# Patient Record
Sex: FEMALE | Race: WHITE | Employment: OTHER | URBAN - METROPOLITAN AREA
[De-identification: names, ages, dates, MRNs, and addresses within clinical notes are randomized per-mention and may not be internally consistent; named-entity substitution may affect disease eponyms.]

---

## 2022-01-26 ENCOUNTER — APPOINTMENT (OUTPATIENT)
Dept: CT IMAGING | Age: 87
DRG: 871 | End: 2022-01-26
Attending: EMERGENCY MEDICINE
Payer: MEDICARE

## 2022-01-26 ENCOUNTER — HOSPITAL ENCOUNTER (INPATIENT)
Age: 87
LOS: 7 days | Discharge: NURSING, REHAB OR LONG-TERM CARE | DRG: 871 | End: 2022-02-02
Attending: EMERGENCY MEDICINE | Admitting: FAMILY MEDICINE
Payer: MEDICARE

## 2022-01-26 ENCOUNTER — APPOINTMENT (OUTPATIENT)
Dept: GENERAL RADIOLOGY | Age: 87
DRG: 871 | End: 2022-01-26
Attending: EMERGENCY MEDICINE
Payer: MEDICARE

## 2022-01-26 ENCOUNTER — HOSPITAL ENCOUNTER (EMERGENCY)
Dept: CT IMAGING | Age: 87
Discharge: HOME | DRG: 871 | End: 2022-01-26
Attending: EMERGENCY MEDICINE
Payer: MEDICARE

## 2022-01-26 DIAGNOSIS — J18.9 PNEUMONIA OF BOTH LOWER LOBES DUE TO INFECTIOUS ORGANISM: ICD-10-CM

## 2022-01-26 DIAGNOSIS — N30.00 ACUTE CYSTITIS WITHOUT HEMATURIA: ICD-10-CM

## 2022-01-26 DIAGNOSIS — R65.21 SEPTIC SHOCK (HCC): Primary | ICD-10-CM

## 2022-01-26 DIAGNOSIS — I95.9 HYPOTENSION, UNSPECIFIED HYPOTENSION TYPE: ICD-10-CM

## 2022-01-26 DIAGNOSIS — A41.9 SEPTIC SHOCK (HCC): Primary | ICD-10-CM

## 2022-01-26 PROBLEM — J44.9 COPD (CHRONIC OBSTRUCTIVE PULMONARY DISEASE) (HCC): Status: ACTIVE | Noted: 2022-01-26

## 2022-01-26 PROBLEM — K80.50 CHOLEDOCHOLITHIASIS: Status: ACTIVE | Noted: 2022-01-26

## 2022-01-26 PROBLEM — J96.01 ACUTE HYPOXEMIC RESPIRATORY FAILURE (HCC): Status: ACTIVE | Noted: 2022-01-26

## 2022-01-26 PROBLEM — R13.10 DIFFICULTY SWALLOWING: Status: ACTIVE | Noted: 2022-01-26

## 2022-01-26 PROBLEM — K82.3: Status: ACTIVE | Noted: 2022-01-26

## 2022-01-26 PROBLEM — K59.00 CONSTIPATION: Status: ACTIVE | Noted: 2022-01-26

## 2022-01-26 PROBLEM — N39.0 UTI (URINARY TRACT INFECTION): Status: ACTIVE | Noted: 2022-01-26

## 2022-01-26 PROBLEM — I10 HTN (HYPERTENSION): Status: ACTIVE | Noted: 2022-01-26

## 2022-01-26 LAB
ALBUMIN SERPL-MCNC: 2.2 G/DL (ref 3.4–5)
ALBUMIN/GLOB SERPL: 0.5 {RATIO} (ref 0.8–1.7)
ALP SERPL-CCNC: 121 U/L (ref 45–117)
ALT SERPL-CCNC: 87 U/L (ref 13–56)
ANION GAP SERPL CALC-SCNC: 12 MMOL/L (ref 3–18)
APPEARANCE UR: CLEAR
AST SERPL-CCNC: 89 U/L (ref 10–38)
BACTERIA URNS QL MICRO: ABNORMAL /HPF
BASOPHILS # BLD: 0 K/UL (ref 0–0.1)
BASOPHILS NFR BLD: 0 % (ref 0–2)
BILIRUB SERPL-MCNC: 0.8 MG/DL (ref 0.2–1)
BILIRUB UR QL: NEGATIVE
BUN SERPL-MCNC: 13 MG/DL (ref 7–18)
BUN/CREAT SERPL: 13 (ref 12–20)
CALCIUM SERPL-MCNC: 9.5 MG/DL (ref 8.5–10.1)
CHLORIDE SERPL-SCNC: 106 MMOL/L (ref 100–111)
CO2 SERPL-SCNC: 24 MMOL/L (ref 21–32)
COLOR UR: YELLOW
COVID-19 RAPID TEST, COVR: NOT DETECTED
CREAT SERPL-MCNC: 1.04 MG/DL (ref 0.6–1.3)
DIFFERENTIAL METHOD BLD: ABNORMAL
EOSINOPHIL # BLD: 0 K/UL (ref 0–0.4)
EOSINOPHIL NFR BLD: 0 % (ref 0–5)
EPITH CASTS URNS QL MICRO: ABNORMAL /LPF (ref 0–5)
ERYTHROCYTE [DISTWIDTH] IN BLOOD BY AUTOMATED COUNT: 26 % (ref 11.6–14.5)
GLOBULIN SER CALC-MCNC: 4.4 G/DL (ref 2–4)
GLUCOSE SERPL-MCNC: 111 MG/DL (ref 74–99)
GLUCOSE UR STRIP.AUTO-MCNC: NEGATIVE MG/DL
HCT VFR BLD AUTO: 39.2 % (ref 35–45)
HGB BLD-MCNC: 12.1 G/DL (ref 12–16)
HGB UR QL STRIP: NEGATIVE
IMM GRANULOCYTES # BLD AUTO: 0.1 K/UL (ref 0–0.04)
IMM GRANULOCYTES NFR BLD AUTO: 1 % (ref 0–0.5)
INR PPP: 1.1 (ref 0.8–1.2)
KETONES UR QL STRIP.AUTO: NEGATIVE MG/DL
LACTATE BLD-SCNC: 4.61 MMOL/L (ref 0.4–2)
LACTATE BLD-SCNC: 5.08 MMOL/L (ref 0.4–2)
LEUKOCYTE ESTERASE UR QL STRIP.AUTO: ABNORMAL
LIPASE SERPL-CCNC: 69 U/L (ref 73–393)
LYMPHOCYTES # BLD: 0.7 K/UL (ref 0.9–3.6)
LYMPHOCYTES NFR BLD: 6 % (ref 21–52)
MAGNESIUM SERPL-MCNC: 2.2 MG/DL (ref 1.6–2.6)
MCH RBC QN AUTO: 25.4 PG (ref 24–34)
MCHC RBC AUTO-ENTMCNC: 30.9 G/DL (ref 31–37)
MCV RBC AUTO: 82.4 FL (ref 78–100)
MONOCYTES # BLD: 0.6 K/UL (ref 0.05–1.2)
MONOCYTES NFR BLD: 5 % (ref 3–10)
NEUTS SEG # BLD: 10.6 K/UL (ref 1.8–8)
NEUTS SEG NFR BLD: 88 % (ref 40–73)
NITRITE UR QL STRIP.AUTO: NEGATIVE
NRBC # BLD: 0 K/UL (ref 0–0.01)
NRBC BLD-RTO: 0 PER 100 WBC
PH UR STRIP: 7 [PH] (ref 5–8)
PLATELET # BLD AUTO: 500 K/UL (ref 135–420)
PLATELET COMMENTS,PCOM: ABNORMAL
PMV BLD AUTO: 9.6 FL (ref 9.2–11.8)
POTASSIUM SERPL-SCNC: 4.7 MMOL/L (ref 3.5–5.5)
PROT SERPL-MCNC: 6.6 G/DL (ref 6.4–8.2)
PROT UR STRIP-MCNC: NEGATIVE MG/DL
PROTHROMBIN TIME: 13.5 SEC (ref 11.5–15.2)
RBC # BLD AUTO: 4.76 M/UL (ref 4.2–5.3)
RBC #/AREA URNS HPF: ABNORMAL /HPF (ref 0–5)
RBC MORPH BLD: ABNORMAL
RBC MORPH BLD: ABNORMAL
SODIUM SERPL-SCNC: 142 MMOL/L (ref 136–145)
SOURCE, COVRS: NORMAL
SP GR UR REFRACTOMETRY: 1.01 (ref 1–1.03)
TROPONIN-HIGH SENSITIVITY: 29 NG/L (ref 0–54)
UROBILINOGEN UR QL STRIP.AUTO: 0.2 EU/DL (ref 0.2–1)
WBC # BLD AUTO: 12 K/UL (ref 4.6–13.2)
WBC URNS QL MICRO: ABNORMAL /HPF (ref 0–5)

## 2022-01-26 PROCEDURE — 87040 BLOOD CULTURE FOR BACTERIA: CPT

## 2022-01-26 PROCEDURE — 71045 X-RAY EXAM CHEST 1 VIEW: CPT

## 2022-01-26 PROCEDURE — 65660000000 HC RM CCU STEPDOWN

## 2022-01-26 PROCEDURE — 74011000636 HC RX REV CODE- 636: Performed by: STUDENT IN AN ORGANIZED HEALTH CARE EDUCATION/TRAINING PROGRAM

## 2022-01-26 PROCEDURE — 81001 URINALYSIS AUTO W/SCOPE: CPT

## 2022-01-26 PROCEDURE — 87635 SARS-COV-2 COVID-19 AMP PRB: CPT

## 2022-01-26 PROCEDURE — 83735 ASSAY OF MAGNESIUM: CPT

## 2022-01-26 PROCEDURE — 74011250636 HC RX REV CODE- 250/636: Performed by: EMERGENCY MEDICINE

## 2022-01-26 PROCEDURE — 86140 C-REACTIVE PROTEIN: CPT

## 2022-01-26 PROCEDURE — 96368 THER/DIAG CONCURRENT INF: CPT

## 2022-01-26 PROCEDURE — 83605 ASSAY OF LACTIC ACID: CPT

## 2022-01-26 PROCEDURE — 74011000258 HC RX REV CODE- 258: Performed by: EMERGENCY MEDICINE

## 2022-01-26 PROCEDURE — 96365 THER/PROPH/DIAG IV INF INIT: CPT

## 2022-01-26 PROCEDURE — 85610 PROTHROMBIN TIME: CPT

## 2022-01-26 PROCEDURE — 83690 ASSAY OF LIPASE: CPT

## 2022-01-26 PROCEDURE — 96367 TX/PROPH/DG ADDL SEQ IV INF: CPT

## 2022-01-26 PROCEDURE — 93005 ELECTROCARDIOGRAM TRACING: CPT

## 2022-01-26 PROCEDURE — 84484 ASSAY OF TROPONIN QUANT: CPT

## 2022-01-26 PROCEDURE — 74011000250 HC RX REV CODE- 250: Performed by: EMERGENCY MEDICINE

## 2022-01-26 PROCEDURE — 71260 CT THORAX DX C+: CPT

## 2022-01-26 PROCEDURE — 99285 EMERGENCY DEPT VISIT HI MDM: CPT

## 2022-01-26 PROCEDURE — 80053 COMPREHEN METABOLIC PANEL: CPT

## 2022-01-26 PROCEDURE — 74011250636 HC RX REV CODE- 250/636: Performed by: STUDENT IN AN ORGANIZED HEALTH CARE EDUCATION/TRAINING PROGRAM

## 2022-01-26 PROCEDURE — 96366 THER/PROPH/DIAG IV INF ADDON: CPT

## 2022-01-26 PROCEDURE — 85025 COMPLETE CBC W/AUTO DIFF WBC: CPT

## 2022-01-26 RX ORDER — POLYETHYLENE GLYCOL 3350 17 G/17G
17 POWDER, FOR SOLUTION ORAL DAILY
COMMUNITY

## 2022-01-26 RX ORDER — SODIUM CHLORIDE 9 MG/ML
100 INJECTION, SOLUTION INTRAVENOUS ONCE
Status: COMPLETED | OUTPATIENT
Start: 2022-01-26 | End: 2022-01-27

## 2022-01-26 RX ORDER — LACTOSE-REDUCED FOOD 0.06G-1/ML
LIQUID (ML) ORAL
COMMUNITY

## 2022-01-26 RX ORDER — ENOXAPARIN SODIUM 100 MG/ML
30 INJECTION SUBCUTANEOUS DAILY
Status: DISCONTINUED | OUTPATIENT
Start: 2022-01-27 | End: 2022-01-27

## 2022-01-26 RX ORDER — ONDANSETRON 4 MG/1
4 TABLET, ORALLY DISINTEGRATING ORAL
Status: DISCONTINUED | OUTPATIENT
Start: 2022-01-26 | End: 2022-02-02 | Stop reason: HOSPADM

## 2022-01-26 RX ORDER — PANTOPRAZOLE SODIUM 20 MG/1
20 TABLET, DELAYED RELEASE ORAL DAILY
COMMUNITY

## 2022-01-26 RX ORDER — CLOPIDOGREL BISULFATE 75 MG/1
TABLET ORAL
COMMUNITY

## 2022-01-26 RX ORDER — NOREPINEPHRINE BITARTRATE/D5W 8 MG/250ML
.5-3 PLASTIC BAG, INJECTION (ML) INTRAVENOUS
Status: DISCONTINUED | OUTPATIENT
Start: 2022-01-26 | End: 2022-01-27

## 2022-01-26 RX ORDER — FACIAL-BODY WIPES
10 EACH TOPICAL DAILY
Status: DISCONTINUED | OUTPATIENT
Start: 2022-01-26 | End: 2022-01-27

## 2022-01-26 RX ORDER — ACETAMINOPHEN 325 MG/1
650 TABLET ORAL
Status: DISCONTINUED | OUTPATIENT
Start: 2022-01-26 | End: 2022-02-02 | Stop reason: HOSPADM

## 2022-01-26 RX ORDER — SODIUM CHLORIDE 0.9 % (FLUSH) 0.9 %
5-40 SYRINGE (ML) INJECTION EVERY 8 HOURS
Status: DISCONTINUED | OUTPATIENT
Start: 2022-01-26 | End: 2022-02-02 | Stop reason: HOSPADM

## 2022-01-26 RX ORDER — SODIUM CHLORIDE 0.9 % (FLUSH) 0.9 %
5-40 SYRINGE (ML) INJECTION AS NEEDED
Status: DISCONTINUED | OUTPATIENT
Start: 2022-01-26 | End: 2022-02-02 | Stop reason: HOSPADM

## 2022-01-26 RX ORDER — LANOLIN ALCOHOL/MO/W.PET/CERES
400 CREAM (GRAM) TOPICAL DAILY
COMMUNITY

## 2022-01-26 RX ORDER — LEVOFLOXACIN 5 MG/ML
750 INJECTION, SOLUTION INTRAVENOUS
Status: DISCONTINUED | OUTPATIENT
Start: 2022-01-28 | End: 2022-01-28

## 2022-01-26 RX ORDER — POLYETHYLENE GLYCOL 3350 17 G/17G
17 POWDER, FOR SOLUTION ORAL DAILY PRN
Status: DISCONTINUED | OUTPATIENT
Start: 2022-01-26 | End: 2022-02-02 | Stop reason: HOSPADM

## 2022-01-26 RX ORDER — LEVOFLOXACIN 5 MG/ML
750 INJECTION, SOLUTION INTRAVENOUS EVERY 24 HOURS
Status: DISCONTINUED | OUTPATIENT
Start: 2022-01-26 | End: 2022-01-26

## 2022-01-26 RX ORDER — SODIUM CHLORIDE 0.9 % (FLUSH) 0.9 %
5-10 SYRINGE (ML) INJECTION AS NEEDED
Status: DISCONTINUED | OUTPATIENT
Start: 2022-01-26 | End: 2022-02-02 | Stop reason: HOSPADM

## 2022-01-26 RX ORDER — BISMUTH SUBSALICYLATE 262 MG
1 TABLET,CHEWABLE ORAL DAILY
COMMUNITY

## 2022-01-26 RX ORDER — ACETAMINOPHEN 650 MG/1
650 SUPPOSITORY RECTAL
Status: DISCONTINUED | OUTPATIENT
Start: 2022-01-26 | End: 2022-02-02 | Stop reason: HOSPADM

## 2022-01-26 RX ORDER — POTASSIUM CHLORIDE 750 MG/1
10 CAPSULE, EXTENDED RELEASE ORAL 2 TIMES DAILY
COMMUNITY

## 2022-01-26 RX ORDER — ACETAMINOPHEN 325 MG/1
TABLET ORAL
COMMUNITY

## 2022-01-26 RX ORDER — ESCITALOPRAM OXALATE 10 MG/1
10 TABLET ORAL DAILY
COMMUNITY

## 2022-01-26 RX ORDER — ATORVASTATIN CALCIUM 40 MG/1
TABLET, FILM COATED ORAL DAILY
COMMUNITY

## 2022-01-26 RX ORDER — ONDANSETRON 2 MG/ML
4 INJECTION INTRAMUSCULAR; INTRAVENOUS
Status: DISCONTINUED | OUTPATIENT
Start: 2022-01-26 | End: 2022-02-02 | Stop reason: HOSPADM

## 2022-01-26 RX ORDER — SENNOSIDES 8.6 MG/1
1 TABLET ORAL DAILY
COMMUNITY

## 2022-01-26 RX ADMIN — SODIUM CHLORIDE 800 ML: 9 INJECTION, SOLUTION INTRAVENOUS at 17:55

## 2022-01-26 RX ADMIN — SODIUM CHLORIDE 500 ML: 900 INJECTION, SOLUTION INTRAVENOUS at 22:51

## 2022-01-26 RX ADMIN — SODIUM CHLORIDE 100 ML/HR: 9 INJECTION, SOLUTION INTRAVENOUS at 21:58

## 2022-01-26 RX ADMIN — PIPERACILLIN AND TAZOBACTAM 4.5 G: 4; .5 INJECTION, POWDER, LYOPHILIZED, FOR SOLUTION INTRAVENOUS; PARENTERAL at 17:20

## 2022-01-26 RX ADMIN — LEVOFLOXACIN 750 MG: 5 INJECTION, SOLUTION INTRAVENOUS at 17:57

## 2022-01-26 RX ADMIN — IOPAMIDOL 100 ML: 612 INJECTION, SOLUTION INTRAVENOUS at 19:27

## 2022-01-26 RX ADMIN — SODIUM CHLORIDE 1000 ML: 9 INJECTION, SOLUTION INTRAVENOUS at 17:20

## 2022-01-26 RX ADMIN — VANCOMYCIN HYDROCHLORIDE 1000 MG: 1 INJECTION, POWDER, LYOPHILIZED, FOR SOLUTION INTRAVENOUS at 19:36

## 2022-01-26 RX ADMIN — NOREPINEPHRINE BITARTRATE 4 MCG/MIN: 8 INJECTION, SOLUTION INTRAVENOUS at 19:41

## 2022-01-26 NOTE — PROGRESS NOTES
Pharmacy Dosing Services:  Levofloxacin    Indication: Sepsis of Unknown Etiology    Previous Regimen Levofloxacin 750 mg IV q24hrs   Serum Creatinine Lab Results   Component Value Date/Time    Creatinine 1.04 01/26/2022 04:45 PM      Creatinine Clearance Estimated Creatinine Clearance: 27.9 mL/min (based on SCr of 1.04 mg/dL). BUN Lab Results   Component Value Date/Time    BUN 13 01/26/2022 04:45 PM         Dose administration notes:  Changed to Levofloxacin 750 mg IV q48hrs per renal dosing protocol    Plan:  Continue to monitor     [facility]

## 2022-01-26 NOTE — PROGRESS NOTES
[address] [facility] Pharmacokinetic Monitoring Service - Vancomycin     Cristian Brooks is a 80 y.o. female starting on vancomycin therapy for Sepsis of Unknown Etiology. Pharmacy consulted by Dr. Nathen Vee for monitoring and adjustment. Target Concentration: Goal AUC/SANJAY 400-600 mg*hr/L    Additional Antimicrobials: Levofloxacin, Zosyn    Pertinent Laboratory Values: Wt Readings from Last 1 Encounters:   01/26/22 52.6 kg (116 lb)     Temp Readings from Last 1 Encounters:   01/26/22 97.1 °F (36.2 °C)     No components found for: PROCAL  Estimated Creatinine Clearance: 27.9 mL/min (based on SCr of 1.04 mg/dL). Recent Labs     01/26/22  1645   WBC 12.0       Plan:  1. Will dose Vancomycin 1000 mg IV x 1, then 750 mg IV q24hrs  2. Estimated  mg/l.hr  Trough 14.5 mg/l  3. BMP x 3 ordered  4.  Pharmacy will continue to follow and adjust.    Ginny Vaughn, [facility], [facility]  1/26/2022 6:18 PM  [phone]

## 2022-01-26 NOTE — Clinical Note
Status[de-identified] INPATIENT [101]   Type of Bed: Telemetry [19]   Cardiac Monitoring Required?: Yes   Inpatient Hospitalization Certified Necessary for the Following Reasons: 3.  Patient receiving treatment that can only be provided in an inpatient setting (further clarification in H&P documentation)   Admitting Diagnosis: Sepsis [facility]) [[id number]]   Admitting Physician: Christopher Irving [[id number]]   Attending Physician: Christopher Irving [[id number]]   Estimated Length of Stay: 3-4 Midnights   Discharge Plan[de-identified] Home with Office Follow-up

## 2022-01-26 NOTE — ED PROVIDER NOTES
19-year-old female presents to the ED from nursing facility for hypotension and tachycardia. Patient is alert but she is not answering questions appropriately not to give a history or physical.  Arrived acutely hypotensive 75 systolic with a pulse of 518. Initial oxygen saturation was 92% on room air she was placed on 4 L of oxygen and transferred to the ED from the nursing home. On arrival patient is overtly septic with hypotension tachycardia code sepsis was called immediately           Past Medical History:   Diagnosis Date    Anemia     [facility]erebral infarct ([facility]     COPD (chronic obstructive pulmonary disease) ([facility]     Essential hypertension     Gastrointestinal hemorrhage     Hyperlipidemia     Tachycardia     TIA (transient ischemic attack)     UTI (urinary tract infection)        History reviewed. No pertinent surgical history. History reviewed. No pertinent family history. Social History     Socioeconomic History    Marital status:      Spouse name: Not on file    Number of children: Not on file    Years of education: Not on file    Highest education level: Not on file   Occupational History    Not on file   Tobacco Use    Smoking status: Unknown If Ever Smoked    Smokeless tobacco: Never Used   Substance and Sexual Activity    Alcohol use: Not Currently    Drug use: Not Currently    Sexual activity: Not on file   Other Topics Concern    Not on file   Social History Narrative    Not on file     Social Determinants of Health     Financial Resource Strain:     Difficulty of Paying Living Expenses: Not on file   Food Insecurity:     Worried About Running Out of Food in the Last Year: Not on file    Eden of Food in the Last Year: Not on file   Transportation Needs:     Lack of Transportation (Medical): Not on file    Lack of Transportation (Non-Medical):  Not on file   Physical Activity:     Days of Exercise per Week: Not on file    Minutes of Exercise per Session: Not on file   Stress:     Feeling of Stress : Not on file   Social Connections:     Frequency of Communication with Friends and Family: Not on file    Frequency of Social Gatherings with Friends and Family: Not on file    Attends Shinto Services: Not on file    Active Member of [address] [place] [address] or Organizations: Not on file    Attends Club or Organization Meetings: Not on file    Marital Status: Not on file   Intimate Partner Violence:     Fear of Current or Ex-Partner: Not on file    Emotionally Abused: Not on file    Physically Abused: Not on file    Sexually Abused: Not on file   Housing Stability:     Unable to Pay for Housing in the Last Year: Not on file    Number of [place] in the Last Year: Not on file    Unstable Housing in the Last Year: Not on file         ALLERGIES: Latex, Codeine, Demerol [meperidine], Fentanyl, Fluticasone, Norco [hydrocodone-acetaminophen], Percocet [oxycodone-acetaminophen], Propoxyphene, Sertraline, and Venlafaxine    Review of Systems   Unable to perform ROS: Mental status change       Vitals:    01/26/22 1715 01/26/22 1720 01/26/22 1725 01/26/22 1730   BP: (!) 78/42 (!) 94/42 (!) 113/53 108/63   Pulse: (!) 136 (!) 126 (!) 128 (!) 122   Resp: 28 25 (!) 32 25   Temp:       SpO2: 98% 99% 100% 100%   Weight:       Height:                Physical Exam  Vitals and nursing note reviewed. Constitutional:       General: She is in acute distress. Appearance: She is underweight. She is ill-appearing, toxic-appearing and diaphoretic. HENT:      Head: Normocephalic and atraumatic. Right Ear: Tympanic membrane and ear canal normal.      Left Ear: Tympanic membrane and ear canal normal.      Nose: Nose normal.      Mouth/Throat:      Mouth: Mucous membranes are dry. Pharynx: No oropharyngeal exudate or posterior oropharyngeal erythema. Eyes:      General:         Right eye: No discharge. Extraocular Movements: Extraocular movements intact. Conjunctiva/sclera: Conjunctivae normal.      Pupils: Pupils are equal, round, and reactive to light. Neck:      Vascular: Carotid bruit present. Cardiovascular:      Rate and Rhythm: Tachycardia present. Occasional extrasystoles are present. Chest Wall: PMI is displaced. Pulses: Normal pulses. Heart sounds: Murmur heard. No friction rub. No gallop. Pulmonary:      Effort: Tachypnea, prolonged expiration and respiratory distress present. Breath sounds: Decreased air movement present. No stridor. Wheezing and rhonchi present. No rales. Chest:      Chest wall: No tenderness or crepitus. Abdominal:      General: Abdomen is flat. Bowel sounds are normal. There is no distension. Palpations: Abdomen is soft. There is no mass. Tenderness: There is no abdominal tenderness. There is no guarding or rebound. Hernia: No hernia is present. Comments: Colostomy     Musculoskeletal:         General: No swelling, tenderness, deformity or signs of injury. Normal range of motion. Cervical back: Normal range of motion. No rigidity or tenderness. Lymphadenopathy:      Cervical: No cervical adenopathy. Skin:     General: Skin is warm. Capillary Refill: Capillary refill takes less than 2 seconds. Coloration: Skin is pale. Skin is not jaundiced. Findings: No bruising or erythema. Neurological:      Mental Status: She is lethargic and disoriented. GCS: GCS eye subscore is 4. GCS verbal subscore is 4. GCS motor subscore is 6. Cranial Nerves: No cranial nerve deficit. Sensory: Sensory deficit present. Motor: Weakness present. No tremor, atrophy or seizure activity.           MDM  Number of Diagnoses or Management Options  Acute cystitis without hematuria  Hypotension, unspecified hypotension type  Pneumonia of both lower lobes due to infectious organism  Septic shock [facility])  Diagnosis management comments: 80-year-old female arrives to the emergency room from her nursing facility in acute distress and over sepsis. She arrived hypotensive and tachycardic. Patient is alert but confused airway was protected. She was found to be hypoxic and started on oxygen. Code sepsis was called immediately patient got aggressive fluid resuscitation antibiotics and labs are sent. COVID-19 testing was done. Chest x-ray consistent with developing pneumonia. He was elevated to 5. Unable to give history and there is no records in the chart on patient's history. 6:53 PM  Covid 19 Negative    At time of turnover pt no longer emergently hypotensive but still tachycardiac.   R/O Sepsis work up still in progress including Labs and CT scans  Pt still critical. Signed out to Dr. Gerald Quiroz with plan for continued active resucitation and diagnostic followup with admision           Critical Care  Performed by: Ruddy Kay MD  Authorized by: Ruddy Kay MD     Critical care provider statement:     Critical care time (minutes):  45    Critical care time was exclusive of:  Separately billable procedures and treating other patients    Critical care was necessary to treat or prevent imminent or life-threatening deterioration of the following conditions:  Sepsis, shock and respiratory failure    Critical care was time spent personally by me on the following activities:  Development of treatment plan with patient or surrogate, discussions with consultants, examination of patient, evaluation of patient's response to treatment, interpretation of cardiac output measurements, ordering and performing treatments and interventions, ordering and review of laboratory studies, ordering and review of radiographic studies, pulse oximetry and re-evaluation of patient's condition

## 2022-01-27 PROBLEM — R41.3 MEMORY LOSS OR IMPAIRMENT: Status: ACTIVE | Noted: 2022-01-27

## 2022-01-27 PROBLEM — E88.09 HYPOALBUMINEMIA: Status: ACTIVE | Noted: 2022-01-27

## 2022-01-27 PROBLEM — E87.6 HYPOKALEMIA: Status: ACTIVE | Noted: 2022-01-27

## 2022-01-27 PROBLEM — K82.3: Status: RESOLVED | Noted: 2022-01-26 | Resolved: 2022-01-27

## 2022-01-27 PROBLEM — Z86.73 HISTORY OF STROKE: Status: ACTIVE | Noted: 2022-01-27

## 2022-01-27 LAB
ALBUMIN SERPL-MCNC: 1.5 G/DL (ref 3.4–5)
ALBUMIN/GLOB SERPL: 0.4 {RATIO} (ref 0.8–1.7)
ALP SERPL-CCNC: 86 U/L (ref 45–117)
ALT SERPL-CCNC: 62 U/L (ref 13–56)
ANION GAP SERPL CALC-SCNC: 11 MMOL/L (ref 3–18)
AST SERPL-CCNC: 58 U/L (ref 10–38)
BILIRUB SERPL-MCNC: 0.6 MG/DL (ref 0.2–1)
BUN SERPL-MCNC: 9 MG/DL (ref 7–18)
BUN/CREAT SERPL: 18 (ref 12–20)
CALCIUM SERPL-MCNC: 7.5 MG/DL (ref 8.5–10.1)
CHLORIDE SERPL-SCNC: 115 MMOL/L (ref 100–111)
CO2 SERPL-SCNC: 21 MMOL/L (ref 21–32)
CREAT SERPL-MCNC: 0.5 MG/DL (ref 0.6–1.3)
CRP SERPL-MCNC: 1.9 MG/DL (ref 0–0.3)
ERYTHROCYTE [DISTWIDTH] IN BLOOD BY AUTOMATED COUNT: 25.1 % (ref 11.6–14.5)
GLOBULIN SER CALC-MCNC: 3.6 G/DL (ref 2–4)
GLUCOSE SERPL-MCNC: 100 MG/DL (ref 74–99)
HCT VFR BLD AUTO: 29.7 % (ref 35–45)
HGB BLD-MCNC: 9.2 G/DL (ref 12–16)
LACTATE SERPL-SCNC: 0.8 MMOL/L (ref 0.4–2)
MAGNESIUM SERPL-MCNC: 1.8 MG/DL (ref 1.6–2.6)
MCH RBC QN AUTO: 25.2 PG (ref 24–34)
MCHC RBC AUTO-ENTMCNC: 31 G/DL (ref 31–37)
MCV RBC AUTO: 81.4 FL (ref 78–100)
NRBC # BLD: 0 K/UL (ref 0–0.01)
NRBC BLD-RTO: 0 PER 100 WBC
PLATELET # BLD AUTO: 361 K/UL (ref 135–420)
PMV BLD AUTO: 10.1 FL (ref 9.2–11.8)
POTASSIUM SERPL-SCNC: 3.3 MMOL/L (ref 3.5–5.5)
PROT SERPL-MCNC: 5.1 G/DL (ref 6.4–8.2)
RBC # BLD AUTO: 3.65 M/UL (ref 4.2–5.3)
SODIUM SERPL-SCNC: 147 MMOL/L (ref 136–145)
WBC # BLD AUTO: 10.4 K/UL (ref 4.6–13.2)

## 2022-01-27 PROCEDURE — 74011250636 HC RX REV CODE- 250/636: Performed by: FAMILY MEDICINE

## 2022-01-27 PROCEDURE — 74011000258 HC RX REV CODE- 258: Performed by: EMERGENCY MEDICINE

## 2022-01-27 PROCEDURE — 74011250636 HC RX REV CODE- 250/636: Performed by: EMERGENCY MEDICINE

## 2022-01-27 PROCEDURE — 74011250636 HC RX REV CODE- 250/636: Performed by: HOSPITALIST

## 2022-01-27 PROCEDURE — P9047 ALBUMIN (HUMAN), 25%, 50ML: HCPCS | Performed by: HOSPITALIST

## 2022-01-27 PROCEDURE — 65660000000 HC RM CCU STEPDOWN

## 2022-01-27 PROCEDURE — 92610 EVALUATE SWALLOWING FUNCTION: CPT

## 2022-01-27 PROCEDURE — 76450000000

## 2022-01-27 PROCEDURE — 74011000250 HC RX REV CODE- 250: Performed by: FAMILY MEDICINE

## 2022-01-27 PROCEDURE — 74011250637 HC RX REV CODE- 250/637: Performed by: HOSPITALIST

## 2022-01-27 PROCEDURE — 92526 ORAL FUNCTION THERAPY: CPT

## 2022-01-27 PROCEDURE — 77010033678 HC OXYGEN DAILY

## 2022-01-27 PROCEDURE — 74011000250 HC RX REV CODE- 250: Performed by: HOSPITALIST

## 2022-01-27 PROCEDURE — 94640 AIRWAY INHALATION TREATMENT: CPT

## 2022-01-27 PROCEDURE — 85027 COMPLETE CBC AUTOMATED: CPT

## 2022-01-27 PROCEDURE — 99221 1ST HOSP IP/OBS SF/LOW 40: CPT | Performed by: NURSE PRACTITIONER

## 2022-01-27 PROCEDURE — 83605 ASSAY OF LACTIC ACID: CPT

## 2022-01-27 PROCEDURE — 83735 ASSAY OF MAGNESIUM: CPT

## 2022-01-27 PROCEDURE — 80053 COMPREHEN METABOLIC PANEL: CPT

## 2022-01-27 PROCEDURE — 36415 COLL VENOUS BLD VENIPUNCTURE: CPT

## 2022-01-27 RX ORDER — IPRATROPIUM BROMIDE AND ALBUTEROL SULFATE 2.5; .5 MG/3ML; MG/3ML
3 SOLUTION RESPIRATORY (INHALATION)
Status: DISCONTINUED | OUTPATIENT
Start: 2022-01-27 | End: 2022-01-27

## 2022-01-27 RX ORDER — SAME BUTANEDISULFONATE/BETAINE 400-600 MG
500 POWDER IN PACKET (EA) ORAL 2 TIMES DAILY
Status: DISCONTINUED | OUTPATIENT
Start: 2022-01-27 | End: 2022-02-02 | Stop reason: HOSPADM

## 2022-01-27 RX ORDER — ENOXAPARIN SODIUM 100 MG/ML
40 INJECTION SUBCUTANEOUS DAILY
Status: DISCONTINUED | OUTPATIENT
Start: 2022-01-28 | End: 2022-02-02 | Stop reason: HOSPADM

## 2022-01-27 RX ORDER — IPRATROPIUM BROMIDE AND ALBUTEROL SULFATE 2.5; .5 MG/3ML; MG/3ML
3 SOLUTION RESPIRATORY (INHALATION)
Status: DISCONTINUED | OUTPATIENT
Start: 2022-01-27 | End: 2022-02-02 | Stop reason: HOSPADM

## 2022-01-27 RX ORDER — DEXTROSE MONOHYDRATE AND SODIUM CHLORIDE 5; .45 G/100ML; G/100ML
50 INJECTION, SOLUTION INTRAVENOUS CONTINUOUS
Status: DISPENSED | OUTPATIENT
Start: 2022-01-27 | End: 2022-01-28

## 2022-01-27 RX ORDER — ALBUMIN HUMAN 250 G/1000ML
25 SOLUTION INTRAVENOUS EVERY 6 HOURS
Status: COMPLETED | OUTPATIENT
Start: 2022-01-27 | End: 2022-01-30

## 2022-01-27 RX ORDER — POTASSIUM CHLORIDE 7.45 MG/ML
10 INJECTION INTRAVENOUS
Status: DISPENSED | OUTPATIENT
Start: 2022-01-27 | End: 2022-01-27

## 2022-01-27 RX ADMIN — ALBUMIN (HUMAN) 25 G: 0.25 INJECTION, SOLUTION INTRAVENOUS at 23:29

## 2022-01-27 RX ADMIN — PIPERACILLIN AND TAZOBACTAM 4.5 G: 4; .5 INJECTION, POWDER, LYOPHILIZED, FOR SOLUTION INTRAVENOUS; PARENTERAL at 05:20

## 2022-01-27 RX ADMIN — VANCOMYCIN HYDROCHLORIDE 750 MG: 750 INJECTION, POWDER, LYOPHILIZED, FOR SOLUTION INTRAVENOUS at 12:26

## 2022-01-27 RX ADMIN — ALBUMIN (HUMAN) 25 G: 0.25 INJECTION, SOLUTION INTRAVENOUS at 18:00

## 2022-01-27 RX ADMIN — Medication 500 MG: at 11:52

## 2022-01-27 RX ADMIN — POTASSIUM CHLORIDE 10 MEQ: 7.46 INJECTION, SOLUTION INTRAVENOUS at 14:00

## 2022-01-27 RX ADMIN — SODIUM CHLORIDE, PRESERVATIVE FREE 10 ML: 5 INJECTION INTRAVENOUS at 00:09

## 2022-01-27 RX ADMIN — ENOXAPARIN SODIUM 30 MG: 100 INJECTION SUBCUTANEOUS at 09:30

## 2022-01-27 RX ADMIN — DEXTROSE AND SODIUM CHLORIDE 50 ML/HR: 5; 450 INJECTION, SOLUTION INTRAVENOUS at 12:05

## 2022-01-27 RX ADMIN — IPRATROPIUM BROMIDE AND ALBUTEROL SULFATE 3 ML: .5; 3 SOLUTION RESPIRATORY (INHALATION) at 04:24

## 2022-01-27 RX ADMIN — ALBUMIN (HUMAN) 25 G: 0.25 INJECTION, SOLUTION INTRAVENOUS at 11:53

## 2022-01-27 RX ADMIN — PIPERACILLIN AND TAZOBACTAM 4.5 G: 4; .5 INJECTION, POWDER, LYOPHILIZED, FOR SOLUTION INTRAVENOUS; PARENTERAL at 17:04

## 2022-01-27 RX ADMIN — SODIUM CHLORIDE, PRESERVATIVE FREE 10 ML: 5 INJECTION INTRAVENOUS at 14:00

## 2022-01-27 RX ADMIN — FAMOTIDINE 20 MG: 10 INJECTION, SOLUTION INTRAVENOUS at 09:30

## 2022-01-27 RX ADMIN — IPRATROPIUM BROMIDE AND ALBUTEROL SULFATE 3 ML: .5; 3 SOLUTION RESPIRATORY (INHALATION) at 08:00

## 2022-01-27 RX ADMIN — SODIUM CHLORIDE, PRESERVATIVE FREE 10 ML: 5 INJECTION INTRAVENOUS at 05:21

## 2022-01-27 RX ADMIN — POTASSIUM CHLORIDE 10 MEQ: 7.46 INJECTION, SOLUTION INTRAVENOUS at 13:00

## 2022-01-27 RX ADMIN — PIPERACILLIN AND TAZOBACTAM 4.5 G: 4; .5 INJECTION, POWDER, LYOPHILIZED, FOR SOLUTION INTRAVENOUS; PARENTERAL at 23:29

## 2022-01-27 RX ADMIN — POTASSIUM CHLORIDE 10 MEQ: 7.46 INJECTION, SOLUTION INTRAVENOUS at 12:25

## 2022-01-27 RX ADMIN — PIPERACILLIN AND TAZOBACTAM 4.5 G: 4; .5 INJECTION, POWDER, LYOPHILIZED, FOR SOLUTION INTRAVENOUS; PARENTERAL at 00:05

## 2022-01-27 NOTE — PROGRESS NOTES
Hospitalist Progress Note-critical care note     Patient: Michelle Camara MRN: [id number]  CSN: [id number]    YOB: 1935  Age: 80 y.o.   Sex: female    DOA: 1/26/2022 LOS:  LOS: 1 day            Chief complaint: sepsis, pna, uti, htn ,respiratory failure with hypoxia      Assessment/Plan         Hospital Problems  Date Reviewed: 1/27/2022          Codes Class Noted POA    Memory loss or impairment ICD-10-CM: R41.3  ICD-9-CM: 780.93  1/27/2022 Yes        History of stroke ICD-10-CM: Z86.73  ICD-9-CM: V12.54  1/27/2022 Yes        Hypokalemia ICD-10-CM: E87.6  ICD-9-CM: 276.8  1/27/2022 Unknown        Hypoalbuminemia ICD-10-CM: E88.09  ICD-9-CM: 273.8  1/27/2022 Unknown        * (Principal) Sepsis ([facility] ICD-10-CM: A41.9  ICD-9-CM: 038.9, 995.91  1/26/2022 Yes        UTI (urinary tract infection) ICD-10-CM: N39.0  ICD-9-CM: 599.0  1/26/2022 Yes        Multifocal pneumonia ICD-10-CM: J18.9  ICD-9-CM: 464  1/26/2022 Yes        COPD (chronic obstructive pulmonary disease) ([facility] ICD-10-CM: J44.9  ICD-9-CM: 684  1/26/2022 Yes        HTN (hypertension) ICD-10-CM: I10  ICD-9-CM: 401.9  1/26/2022 Yes        Acute hypoxemic respiratory failure ([facility] ICD-10-CM: J96.01  ICD-9-CM: 518.81  1/26/2022 Yes        Choledocholithiasis ICD-10-CM: K80.50  ICD-9-CM: 574.50  1/26/2022 Yes        Difficulty swallowing ICD-10-CM: R13.10  ICD-9-CM: 787.20  1/26/2022 Yes        Constipation ICD-10-CM: K59.00  ICD-9-CM: 564.00  1/26/2022 Yes                  80year-old female with prior stroke, hypertension, memory impairment, COPD, and choledocholithiasis with chronic gallbladder fistula is admitted for acute hypoxemic respiratory failure with sepsis due to multifocal pneumonia and UTI.     Acute respiratory failure with hypoxia   Improving, down to 2 L nc oxygen AM   Due to pneumonia   Breathing tx as needed   Weaning off nc oxygen   covid 19 test negative     pna-hcap   Continue abx   Zosyn, vancomycin, and Levaquin for broad-spectrum antibiotic treatment  Incentive spirometry  Duonebs as needed     Hx of Choledocholithiasis with gallbladder fistula-she was deemed not a surgical candidate and she previously pulled out a cholecystostomy tube-repeated ct scan on 1/26:  LIVER/BILIARY: No suspicious liver lesion. No biliary dilation. Gallbladder  unremarkable.     Dysphagia   Aspiration precaution,speech evaluation    Hypokalemia  Potassium replacement will check magnesium also     Memory impairment with history of strokepossible dementia     Hypertensionnot on home medications    Hypoalbuminemia 1.5 albumin   Continue albumin infusion    Hypernatremia   d5 0.45 low rate      Constipation-resolved. Loose stool from a colectomy bag, d/c Dulcolax    Subjective: I want to eat    RN: Loose stool      K replacement, add florastor, continue iv abx, give albumin     Disposition :tbd,   Review of systems:    General: No fevers or chills. Cardiovascular: No chest pain or pressure. No palpitations. Pulmonary: No shortness of breath. Gastrointestinal: No nausea, vomiting. Vital signs/Intake and Output:  Visit Vitals  BP (!) 120/6   Pulse 99   Temp 97.6 °F (36.4 °C)   Resp 17   Ht 5' (1.524 m)   Wt 52.6 kg (116 lb)   SpO2 100%   BMI 22.65 kg/m²     Current Shift:  No intake/output data recorded. Last three shifts:  01/25 1901 - 01/27 0700  In: 1100 [I.V.:1100]  Out: -     Physical Exam:  General: WD, WN. Alert, cooperative, no acute distress    HEENT: NC, Atraumatic. PERRLA, anicteric sclerae. Lungs: CTA Bilaterally. No Wheezing/Rhonchi/Rales. Heart:  Regular  rhythm,  No murmur, No Rubs, No Gallops  Abdomen: Soft, Non distended, Non tender. +Bowel sounds, colectomy bag noted with liquid stool   Extremities: No c/c/e  Psych:   Not anxious or agitated. Neurologic:  No acute neurological deficit.              Labs: Results:       Chemistry Recent Labs     01/27/22  0327 01/26/22  1645   * 111*   * 142   K 3.3* 4.7   CL 115* 106   CO2 21 24   BUN 9 13   CREA 0.50* 1.04   CA 7.5* 9.5   AGAP 11 12   BUCR 18 13   AP 86 121*   TP 5.1* 6.6   ALB 1.5* 2.2*   GLOB 3.6 4.4*   AGRAT 0.4* 0.5*      CBC w/Diff Recent Labs     01/27/22  0327 01/26/22  1645   WBC 10.4 12.0   RBC 3.65* 4.76   HGB 9.2* 12.1   HCT 29.7* 39.2    500*   GRANS  --  88*   LYMPH  --  6*   EOS  --  0      Cardiac Enzymes No results for input(s): CPK, CKND1, OVIDIO in the last 72 hours. No lab exists for component: CKRMB, TROIP   Coagulation Recent Labs     01/26/22  1645   PTP 13.5   INR 1.1       Lipid Panel No results found for: CHOL, CHOLPOCT, CHOLX, CHLST, CHOLV, 222723, HDL, HDLP, LDL, LDLC, DLDLP, 796231, VLDLC, VLDL, TGLX, TRIGL, TRIGP, TGLPOCT, CHHD, CHHDX   BNP No results for input(s): BNPP in the last 72 hours. Liver Enzymes Recent Labs     01/27/22  0327   TP 5.1*   ALB 1.5*   AP 86      Thyroid Studies No results found for: T4, T3U, TSH, TSHEXT, TSHEXT     Procedures/imaging: see electronic medical records for all procedures/Xrays and details which were not copied into this note but were reviewed prior to creation of Plan    CT CHEST ABD PELV W CONT    Result Date: 1/26/2022  EXAM:CT chest abdomen and pelvis with contrast CLINICAL INDICATION/HISTORY: Sepsis, hypotension COMPARISON: None TECHNIQUE: Multislice helical CT was performed through the chest, abdomen and pelvis with IV contrast. Coronal and sagittal reformations were generated. One or more dose reduction techniques were used on this CT: automated exposure control, adjustment of the mAs and/or kVp according to patient's size, and iterative reconstruction techniques. The specific techniques utilized on this CT exam have been documented in the patient's electronic medical record.   Digital imaging and communications and medicine (DICOM) format image data are available to nonaffiliated external healthcare facilities or entities on a secure, media free, reciprocally searchable basis with patient authorization for at least a 12 month period after this study. ========== FINDINGS: ------------------ CHEST: LUNGS: There is streaky and patchy opacities throughout the left lower lobe most pronounced basilar segments and mild patchy opacity basilar right lower lobe. Streaky parenchymal density middle lobe and lingula and mild additional peripheral chronic fibrotic changes with mild biapical scarring. No abnormal mass. PLEURA: Minimal left pleural effusion. AIRWAY: Mild bilateral lower lobe bronchiectasis. MEDIASTINUM: Normal heart and great vessels. No adenopathy. OTHER: Multiple chronic appearing left-sided rib fractures. No acute or aggressive osseous finding. ------------------ ABDOMEN/PELVIS: LIVER/BILIARY: No suspicious liver lesion. No biliary dilation. Gallbladder unremarkable. SPLEEN: Normal. PANCREAS: Normal. ADRENALS: Normal. KIDNEYS: Small nonobstructing bilateral renal calculi. No abnormal mass or obstruction. Benign-appearing left lower pole renal cyst. Ureters and bladder unremarkable. LYMPH NODES: No technically enlarged nodes. GI TRACT: Mildly distended stool-filled rectum with nonspecific air-fluid levels within colon without significant dilatation or wall thickening. No abnormal small bowel dilatation or wall thickening. No ascites or free air. VASCULATURE: Moderate aortoiliac atherosclerotic calcification. PELVIC ORGANS: Previous hysterectomy. OTHER: No aggressive appearing osseous lesion. Multilevel lower lumbar degenerative spondylosis. ==========     1. Bilateral lower lobe pneumonia left greater than right with minimal left pleural effusion with bilateral lower lobe bronchiectasis. Additional mild areas of atelectasis or scarring bilaterally. 2. Mildly distended stool-filled rectum likely related to constipation with no evidence of obstruction. 3. No other acute or significant abdominal or pelvic CT finding.      XR CHEST PORT    Result Date: 1/26/2022  EXAM: Portable frontal view of the chest. CLINICAL INDICATION/HISTORY: Hypotension COMPARISON: None. _______________ FINDINGS: Normal heart size with no mediastinal mass. Cardiac device overlying left side heart. There is some hazy abnormal parenchymal density left lung base with slight blunting left lateral costophrenic angle. No other lung consolidation or mass. Multiple chronic appearing left-sided rib fractures. _______________     1. FINDINGS suggestive of pneumonia and/or atelectasis left lower lung, possible small left pleural effusion.       Fede Carballo MD

## 2022-01-27 NOTE — PROGRESS NOTES
Patient will:  1. Tolerate PO trials with 0 s/s overt distress in 4/5 trials. 2. Utilize compensatory swallow strategies/maneuvers (decrease bite/sip, size/rate, alt. liq/sol) with min cues in 4/5 trials. 3. Perform oral-motor/laryngeal exercises to increase oropharyngeal swallow function with min cues. Rec:     Moist and minced diet with thin liquids  Pt may require assistance with meal set up  Aspiration precautions  HOB >45 during po intake, remain >30 for 30-45 minutes after po   Small bites/sips; alternate liquid/solid with slow feeding rate   Oral care TID  Meds crushed in puree    [address]   EVALUATION & TREATMENT     Patient: Jose Valdovinos (8 y.o. female)  Date: 1/27/2022  Primary Diagnosis: Sepsis ([address] [A41.9]        Precautions: Aspiration    PLOF: History of CVA and PNA    ASSESSMENT :  Pt seen this day for bedside swallow evaluation and tx. Alert and oriented to person and place. Unable to verbalize reason for hospitalization and past history with eating/drinking. OME revealed decreased lingual strength and ROM. Pt managed excess secretions by spitting secretions into container. Based on the objective data described below, the patient presents with mild-moderate oral phase dysphagia c/b inefficient/piecemeal deglutition and poor oral clearance of solid textures. Pt expelled >75% of solid textures into cup and and required liquid wash to clear remainder of oral cavity. Pt stated, \"I don't want this. \" Pt tolerated PO trials of thin liquids via spoon, straw, and cup sip x 10 with no overt s/sx of aspiration observed. Pt tolerated PO trials of puree via spoon x 2 with no overt s/sx of aspiration. Pt appropriate for diet upgrade to minced moist with thin liquids based on assessment.  Nurse present during bedside swallow and results d/w Dr. Maribel Coates.     TREATMENT :  Skilled therapy initiated; Educated pt on aspiration precautions and importance of compensatory swallow techniques to decrease aspiration risk (decrease rate of intake & sip/bite size, upright @HOB for all po intake and ~30 minutes after po); verbalized comprehension. SLP to follow as indicated. Patient will benefit from skilled intervention to address the above impairments. Patient's rehabilitation potential is considered to be Fair  Factors which may influence rehabilitation potential include:  []            None noted  [x]            Mental ability/status  [x]            Medical condition  [x]            Home/family situation and support systems  [x]            Safety awareness  []            Pain tolerance/management  []            Other:      PLAN :  Recommendations and Planned Interventions:  See above  Frequency/Duration: Patient will be followed by speech-language pathology 1-2 times per day/3 - 5 days per week to address goals. Discharge Recommendations: Skilled Nursing Facility     SUBJECTIVE:   Patient stated I want ice water\"    OBJECTIVE:     Past Medical History:   Diagnosis Date    Anemia     [facility]     COPD (chronic obstructive pulmonary disease) [facility]     Essential hypertension     Gastrointestinal hemorrhage     Hyperlipidemia     Stroke ([facility] [facility]     Tachycardia     TIA (transient ischemic attack)     UTI (urinary tract infection)    History reviewed. No pertinent surgical history.   Home Situation:      Diet prior to admission: Unable to verbalize  Current Diet:  Minced and moist; thin    Cognitive and Communication Status:  Neurologic State: Alert,Eyes open to stimulus  Orientation Level: Oriented to person,Oriented to place  Cognition: Decreased attention/concentration,Decreased command following     Perseveration: No perseveration noted  Safety/Judgement: Awareness of environment  Oral Assessment:  Oral Assessment  Labial: No impairment  Dentition: Limited  Oral Hygiene:  (fair)  Lingual: Decreased rate;Decreased strength  Velum: Unable to visualize  Mandible: No impairment  Gag Reflex:  (did not assess)  P.O. Trials:  Patient Position:  (upright in bed)  Vocal quality prior to P.O.: Breathy;Low volume  Consistency Presented: Ice chips;Puree; Solid; Thin liquid  How Presented: Self-fed/presented;SLP-fed/presented;Spoon;Straw;Successive swallows     Bolus Acceptance: Other (comment) (expelled solid texture into cup by bedside)  Bolus Formation/Control: Impaired  Type of Impairment: Delayed  Propulsion: Delayed (# of seconds)  Oral Residue: Greater than 50% of bolus; Lingual  Initiation of Swallow: No impairment  Laryngeal Elevation: Functional  Aspiration Signs/Symptoms: None  Pharyngeal Phase Characteristics: Easily fatigued ; Poor endurance  Effective Modifications: Alternate liquids/solids;Small sips and bites  Cues for Modifications: Minimal       Oral Phase Severity: Mild-moderate  Pharyngeal Phase Severity : No impairment    PAIN:  Pain level pre-treatment: 0/10   Pain level post-treatment: 0/10     After treatment:   []            Patient left in no apparent distress sitting up in chair  [x]            Patient left in no apparent distress in bed  []            Call bell left within reach  [x]            Nursing notified  []            Family present  []            Caregiver present  []            Bed alarm activated    COMMUNICATION/EDUCATION:   [x]            Aspiration precautions; swallow safety; compensatory techniques. []            Patient/family have participated as able in goal setting and plan of care. []            Patient/family agree to work toward stated goals and plan of care. []            Patient understands intent and goals of therapy; neutral about participation. []            Patient unable to participate in goal setting/plan of care; educ ongoing with interdisciplinary staff  []         Posted safety precautions in patient's room.     Thank you for this referral.  DIOGO Roman  Time Calculation: 30 mins  Evaluation Time: 20 minutes Treatment Time: 10 minutes

## 2022-01-27 NOTE — PROGRESS NOTES
Reason for Admission:   Chart reviewed; per H&P, patient is a \"80 y.o. female with prior stroke, hypertension, memory impairment, COPD, and choledocholithiasis with gallbladder fistula who presents via EMS from the [facility] at [facility] for hypotension and shortness of breath. History is unobtainable from the patient due to memory impairment. She walks with a walker. The patient is unable to distinguish the difference between being at the [facility] vs. the hospital.\"                    RUR Score: Moderate; 15%                PCP: First and Last name:   Marylu Cruz MD     Name of Practice: Family Practice through [address] [place] [address]   Are you a current patient: Yes/No: yes   Approximate date of last visit:    Can you participate in a virtual visit if needed:     Do you (patient/family) have any concerns for transition/discharge? Return to [facility] for utilizing home health:   Resides at [address] [place] [address]; [place]    Current Advanced Directive/Advance Care Plan:  DNR      Healthcare Decision Maker:   Click here to complete [place] including selection of the Healthcare Decision Maker Relationship (ie \"Primary\")          Tete Galicia, son - Primary - [phone]    Transition of Care Plan:    7210:  Care manager rounded and patient held a gaze, nodded once to question but was essentially nonverbal; contacted patient's son Genia Lange who verified that palliative care had spoken to him earlier today. Appeared unhappy with the timely notification of status from SNF facility the [facility] but is willing to let patient return when she is medically stable - will keep son apprised of patient status and estimate of time to return to LTC. Care Management Interventions  PCP Verified by CM:  Yes  Mode of Transport at Discharge: Self  Transition of Care Consult (CM Consult): Discharge [address]: Child(feliberto)  Confirm Follow Up Transport: Other (see comment)  The Plan for Transition of Care is Related to the Following Treatment Goals : sepsis  The Patient and/or Patient Representative was Provided with a Choice of Provider and Agrees with the Discharge Plan?: Yes  Name of the Patient Representative Who was Provided with a Choice of Provider and Agrees with the Discharge Plan: Kostas Medinaernestina bean  [place] of Choice List was Provided with Basic Dialogue that Supports the Patient's Individualized Plan of Care/Goals, Treatment Preferences and Shares the Quality Data Associated with the Providers?: Yes  Discharge Location  Patient Expects to be Discharged to[de-identified] [facility]

## 2022-01-27 NOTE — PROGRESS NOTES
conducted an initial consultation and Spiritual Assessment for José Manuel Gunderson, who is a 80 y.o.,female. Patient's Primary Language is: [place]. According to the patient's EMR [facility] Affiliation is: No preference. The reason the Patient came to the hospital is:   Patient Active Problem List    Diagnosis Date Noted    Memory loss or impairment 01/27/2022    History of stroke 01/27/2022    Hypokalemia 01/27/2022    Hypoalbuminemia 01/27/2022    Sepsis ([facility] [address] 01/26/2022    UTI (urinary tract infection) 01/26/2022    Multifocal pneumonia 01/26/2022    COPD (chronic obstructive pulmonary disease) ([facility] 01/26/2022    HTN (hypertension) 01/26/2022    Acute hypoxemic respiratory failure ([facility] 01/26/2022    Choledocholithiasis 01/26/2022    Difficulty swallowing 01/26/2022    Constipation 01/26/2022        The  provided the following Interventions:  Initiated a relationship of care and support. Offered prayer and assurance of continued prayers on patient's behalf. The following outcomes where achieved:  Patient processed feeling about current hospitalization. Patient expressed gratitude for 's visit. Assessment:  Patient does not have any Islam/cultural needs that will affect patient's preferences in health care. There are no spiritual or Islam issues which require intervention at this time. Plan:  Chaplains will continue to follow and will provide pastoral care on an as needed/requested basis.  recommends bedside caregivers page  on duty if patient shows signs of acute spiritual or emotional distress.     [address]

## 2022-01-27 NOTE — PROGRESS NOTES
1/27/2022 PT note: consult received and chart reviewed. Evaluation attempted. Pt currently long sitting in bed eating meal.  Will f/u at later time as pt schedule allows for PT evaluation.  Thank you for this referral.   Simba Lyman, PT

## 2022-01-27 NOTE — PROGRESS NOTES
Pharmacy Dosing Services: Famotidine      Indication: SUP   Previous Regimen Famotidine 20 mg IV Bid   Serum Creatinine Lab Results   Component Value Date/Time    Creatinine 0.50 (L) 01/27/2022 03:27 AM      Creatinine Clearance Estimated Creatinine Clearance: 41.4 mL/min (A) (by C-G formula based on SCr of 0.5 mg/dL (L)).    BUN Lab Results   Component Value Date/Time    BUN 9 01/27/2022 03:27 AM       Dose adjusted based on CrCl and Indication to Famotidine 20 mg IV Daily  Plan:  Continue to monitor

## 2022-01-27 NOTE — CONSULTS
Palliative Medicine Consult    Patient Name: Carlos Herrera  YOB: 1935    Date of Initial Consult: 2022  Reason for Consult: Goals of care discussions  Requesting Provider: Dr. Tish Beatty   Primary Care Physician: Mal Dickson MD      SUMMARY:   Carlos Herrera is a 80 y.o. with a past history of prior stroke, hypertension, memory impairment, COPD, and choledocholithiasis with chronic gallbladder fistula, who was admitted on 2022 from home with a diagnosis of acute hypoxemic respiratory failure with sepsis due to multifocal pneumonia and UTI. Current medical issues leading to Palliative Medicine involvement include: support and goals of care discussions. PALLIATIVE DIAGNOSES:   1. Goals of care discussions  2. Sepsis, pneumonia, UTI  3. Respiratory failure with hypoxia  4. Advanced age/debility/memory impairment       PLAN:   1. Goals of care discussions: Palliative medicine team including Hu Diez RN and I met with patient at patient's bedside. Patient is awake, alert, in NAD, oriented to person and place, disoriented to current time and situation. Called the [address] [place] [address] to inquire about AMD/POA/DNR documentation-they faxed over a general POA, does not clearly chaz medical decision making. Called patient's son/LNOK Tony Marrufo to introduce the role of palliative medicine and offer support. Duarte Zamudio states that patient has another daughter Aleja Crew who was adopted) who has been estranged for years with no known way to contact her and one other daughter who is . Patient's LNOK defaults to patient's son Tony Marrufo. Confirmed DNR/DNI status. He will look to see if he has a DDNR document he can send us. If not, will suggest POST form completion prior to discharge. We will continue to follow closely with you. 2. Sepsis, pneumonia, UTI: Respiratory failure improving, now on 2 lpm via NC. COVID-19 testing negative. On broad spectrum IVAB.   3. Respiratory failure with hypoxia: Secondary to number 2. Wean O2 as tolerated. 4. Advanced age/debility/Memory impairment: 80year old who lives at the [facility], who needs assist with all ADLs. She is forgetful and oriented to person and place today. 5. Initial consult note routed to primary continuity provider  6.  Communicated plan of care with: Palliative IDT       GOALS OF CARE / TREATMENT PREFERENCES:   [====Goals of Care====]  GOALS OF CARE: DNR/DNI  Patient/Health Care Proxy Stated Goals: Prolong life      TREATMENT PREFERENCES:   Code Status: DNR    Advance Care Planning:  Advance Care Planning 1/27/2022   Patient's Healthcare Decision Maker is: Legal Next of Kin   Confirm Advance Directive None   Patient Would Like to Complete Advance Directive Unable       Medical Interventions: Limited additional interventions            The palliative care team has discussed with patient / health care proxy about goals of care / treatment preferences for patient.  [====Goals of Care====]         HISTORY:     History obtained from: patient's chart, family    CHIEF COMPLAINT: confused    HPI/SUBJECTIVE:    The patient is:   [x] Verbal and participatory (limited)  [] Non-participatory due to:   Oriented to person and place, disoriented to current time and situation     Clinical Pain Assessment (nonverbal scale for severity on nonverbal patients):   Clinical Pain Assessment  Severity: 0             FUNCTIONAL ASSESSMENT:     Palliative Performance Scale (PPS):  PPS: 40       PSYCHOSOCIAL/SPIRITUAL SCREENING:     Advance Care Planning:  Advance Care Planning 1/27/2022   Patient's Healthcare Decision Maker is: Legal Next of Kin   Confirm Advance Directive None   Patient Would Like to Complete Advance Directive Unable        Any spiritual / Roman Catholic concerns: unable to assess  [] Yes /  [] No    Caregiver Burnout:  [] Yes /  [] No /  [x] No Caregiver Present      Anticipatory grief assessment: unable to assess  [] Normal  / [] Maladaptive          REVIEW OF SYSTEMS:     Positive and pertinent negative findings in ROS are noted above in HPI. The following systems were [x] reviewed / [] unable to be reviewed as noted in HPI  Other findings are noted below. Systems: constitutional, ears/nose/mouth/throat, respiratory, gastrointestinal, genitourinary, musculoskeletal, integumentary, neurologic, psychiatric, endocrine. Positive findings noted below. Modified ESAS Completed by: provider           Pain: 0           Dyspnea: 0                    PHYSICAL EXAM:     From RN flowsheet:  Wt Readings from Last 3 Encounters:   01/26/22 52.6 kg (116 lb)     Blood pressure (!) 120/6, pulse 99, temperature 97.6 °F (36.4 °C), resp. rate 17, height 5' (1.524 m), weight 52.6 kg (116 lb), SpO2 100 %. Pain Scale 1: FACES  Pain Intensity 1: 0                   Constitutional: Awake, alert, pleasantly confused, appears in NAD  Eyes: pupils equal, anicteric  ENMT: no nasal discharge, moist mucous membranes  Cardiovascular: regular rhythm, distal pulses intact  Respiratory: breathing not labored, symmetric  Gastrointestinal: soft non-tender  Musculoskeletal: no deformity, no tenderness to palpation  Skin: warm, dry  Neurologic: following commands, moving all extremities, oriented to person and place.  Disoriented to current time and situation  Psychiatric: full affect, no hallucinations         HISTORY:     Principal Problem:    Sepsis ([address] (1/26/2022)    Active Problems:    UTI (urinary tract infection) (1/26/2022)      Multifocal pneumonia (1/26/2022)      COPD (chronic obstructive pulmonary disease) ([address] (1/26/2022)      HTN (hypertension) (1/26/2022)      Acute hypoxemic respiratory failure ([address] (1/26/2022)      Choledocholithiasis (1/26/2022)      Difficulty swallowing (1/26/2022)      Constipation (1/26/2022)      Memory loss or impairment (1/27/2022)      History of stroke (1/27/2022)      Hypokalemia (1/27/2022)      Hypoalbuminemia (1/27/2022)      Past Medical History:   Diagnosis Date    Anemia     [facility]     COPD (chronic obstructive pulmonary disease) (HCC)     Essential hypertension     Gastrointestinal hemorrhage     Hyperlipidemia     Stroke (HCC)     Tachycardia     TIA (transient ischemic attack)     UTI (urinary tract infection)       History reviewed. No pertinent surgical history. History reviewed. No pertinent family history. History reviewed, no pertinent family history.   Social History     Tobacco Use    Smoking status: Unknown If Ever Smoked    Smokeless tobacco: Never Used   Substance Use Topics    Alcohol use: Not Currently     Allergies   Allergen Reactions    Latex Unknown (comments)    Codeine Unknown (comments)    Demerol [Meperidine] Unknown (comments)    Fentanyl Unknown (comments)    Fluticasone Unknown (comments)    Norco [Hydrocodone-Acetaminophen] Unknown (comments)    Percocet [Oxycodone-Acetaminophen] Unknown (comments)    Propoxyphene Unknown (comments)    Sertraline     Venlafaxine Unknown (comments)      Current Facility-Administered Medications   Medication Dose Route Frequency    potassium chloride 10 mEq in 100 ml IVPB  10 mEq IntraVENous Q1H    albuterol-ipratropium (DUO-NEB) 2.5 MG-0.5 MG/3 ML  3 mL Nebulization Q6H PRN    Saccharomyces boulardii (FLORASTOR) capsule 500 mg  500 mg Oral BID    [START ON 1/28/2022] enoxaparin (LOVENOX) injection 40 mg  40 mg SubCUTAneous DAILY    albumin human 25% (BUMINATE) solution 25 g  25 g IntraVENous Q6H    dextrose 5 % - 0.45% NaCl infusion  50 mL/hr IntraVENous CONTINUOUS    piperacillin-tazobactam (ZOSYN) 4.5 g in 0.9% sodium chloride (MBP/ADV) 100 mL MBP  4.5 g IntraVENous Q6H    vancomycin (VANCOCIN) 750 mg in 0.9% sodium chloride 250 mL (VIAL-MATE)  750 mg IntraVENous Q12H    [START ON 1/28/2022] Vancomycin Random Draw 1/28/22 at 0700  1 Each Other ONCE    [START ON 1/28/2022] famotidine (PF) (PEPCID) 20 mg in 0.9% sodium chloride 10 mL injection  20 mg IntraVENous DAILY    sodium chloride (NS) flush 5-10 mL  5-10 mL IntraVENous PRN    Vancomycin - Pharmacy to Dose  1 Each Other Rx Dosing/Monitoring    [START ON 1/28/2022] levoFLOXacin (LEVAQUIN) 750 mg in D5W IVPB  750 mg IntraVENous Q48H    sodium chloride (NS) flush 5-40 mL  5-40 mL IntraVENous Q8H    sodium chloride (NS) flush 5-40 mL  5-40 mL IntraVENous PRN    acetaminophen (TYLENOL) tablet 650 mg  650 mg Oral Q6H PRN    Or    acetaminophen (TYLENOL) suppository 650 mg  650 mg Rectal Q6H PRN    polyethylene glycol (MIRALAX) packet 17 g  17 g Oral DAILY PRN    ondansetron (ZOFRAN ODT) tablet 4 mg  4 mg Oral Q8H PRN    Or    ondansetron (ZOFRAN) injection 4 mg  4 mg IntraVENous Q6H PRN          LAB AND IMAGING FINDINGS:     Lab Results   Component Value Date/Time    WBC 10.4 01/27/2022 03:27 AM    HGB 9.2 (L) 01/27/2022 03:27 AM    PLATELET 302 84/03/8473 03:27 AM     Lab Results   Component Value Date/Time    Sodium 147 (H) 01/27/2022 03:27 AM    Potassium 3.3 (L) 01/27/2022 03:27 AM    Chloride 115 (H) 01/27/2022 03:27 AM    CO2 21 01/27/2022 03:27 AM    BUN 9 01/27/2022 03:27 AM    Creatinine 0.50 (L) 01/27/2022 03:27 AM    Calcium 7.5 (L) 01/27/2022 03:27 AM    Magnesium 2.2 01/26/2022 04:45 PM      Lab Results   Component Value Date/Time    Alk.  phosphatase 86 01/27/2022 03:27 AM    Protein, total 5.1 (L) 01/27/2022 03:27 AM    Albumin 1.5 (L) 01/27/2022 03:27 AM    Globulin 3.6 01/27/2022 03:27 AM     Lab Results   Component Value Date/Time    INR 1.1 01/26/2022 04:45 PM    Prothrombin time 13.5 01/26/2022 04:45 PM      No results found for: IRON, FE, TIBC, IBCT, PSAT, FERR   No results found for: PH, PCO2, PO2  No components found for: GLPOC   No results found for: CPK, CKMB             Total time: 30 minutes  Counseling / coordination time, spent as noted above:   > 50% counseling / coordination?: yes    Prolonged service was provided for  []30 min []75 min in face to face time in the presence of the patient, spent as noted above. Time Start:   Time End:   Note: this can only be billed with 61788 (initial) or 73738 (follow up). If multiple start / stop times, list each separately.

## 2022-01-27 NOTE — ACP (ADVANCE CARE PLANNING)
Advance Care Planning     General Advance Care Planning (ACP) Conversation    Date of Conversation: 2022  Conducted with: Patient and Healthcare Decision Maker: Named in Advance Directive or Healthcare Power of [employer] Decision Maker:     Primary Decision Maker: Irma Brito Son - [phone]    Today we documented Decision Maker(s) consistent with ACP documents on file. Content/Action Overview: Has ACP document(s) on file - reflects the patient's care preferences  Reviewed DNR/DNI and son elects DNR order. 2022 0843 Seen today in room 357 along with Pippa Woodward NP. Lying in her bed resting on her right side. Awake, alert. Oriented to person, place, year, and president but not to situation. Respirations unlabored. Oxygen on at 2 lpm per NC . Able to speak in full  sentences. Pain -- denies     Is unaware of where she lives stating \"I have been in the hospital for so long I just don't know\". States she has three children: Gregory Estrada, and Benjamin. Came to the ED via EMS from the [facility] for shortness of breath and hypotension. Found to have pneumonia. Admitted for IVF, IV antibiotics, oxygen supplementation, SLP evaluation for aspiration risk    PMH significant for anemia, CVA, COPD, HTN    The palliative care team has been consulted for goals of care discussion    1100: telephone call with Natalie Gimenez, son. Introduced the role of palliative medicine for the hospitalized patient. Brief medical update given. He states he has MPOA and that [facility] is available from [facility]. We have received those documents. He states she has a DNR document but that is not in the documents we have received. Natalie Gimenez said that his mother has three children: him, a sister, Ruthie Ariza, and a  sister, Sis Garcia. He states that his mother and Ruthie Ariza are estranged. 1330: called Natalie Gimenez to let him know that there is no DNR document in the papers he sent.  He is going to look and see if he can find them. If not, he can do a POST to formalize the choices. Disposition plan: anticipate she will return to her facility    Palliative care will continue to follow María Narayan  and her family during her hospitalization and support them as they make healthcare decisions and define goals of care.       Alona Huang RN, MSN  Palliative Medicine  P: [phone]

## 2022-01-27 NOTE — PROGRESS NOTES
PT STATES THAT THEY DO NOT LIKE THE BREATHING TREATMENTS. PT GAVE POOR EFFORT WITH CPT FLUTTER VALVE. PT DOES NOT WANT THE VEST.

## 2022-01-27 NOTE — ED NOTES
Received patient in signout from prior provider, in brief this is an 70-year-old female from a nursing facility with hypotension and tachycardia. Arrives to the ED with a systolic blood pressure of 75 and tachycardic to 146, very fluid responsive, now after 30 cc/kg blood pressure is 112/55 and her maps have been greater than 65. Found to be slightly hypoxic, satting 100% on 4 L nasal cannula    No leukocytosis but initial lactic is 5.08, trended down to 4.61  She does have a urinary tract infection    Signed out to me pending chemistry which looks okay, CT abdomen pelvis which shows a bilateral lower pneumonia left greater than right. Patient adequately covered with broad-spectrum antibiotics ordered by prior provider. Bedside echo shows hyperdynamic EF, no pericardial effusion and good mitral valve excursion. Some faint B-lines at the bases, likely due to her pneumonia, no obvious fluid overload. Clinically suspect she still has some room to go before she was adequately hydrated, given her age will start gentle maintenance at 100 cc/h    Will discuss with the hospitalist for admission. 10:18 PM  Discussed with Dr. Tammy Loja, who agrees with admission. I appreciate her assistance.      Zen Ward MD

## 2022-01-27 NOTE — H&P
History & Physical    Patient: Gene Valverde MRN: [id number]  CSN: [id number]    YOB: 1935  Age: 80 y.o. Sex: female      DOA: 1/26/2022  Primary Care Provider:  Sary Edwards MD      Assessment/Plan     Patient Active Problem List   Diagnosis Code    Sepsis ([facility] [address] A41.9    UTI (urinary tract infection) N39.0    Multifocal pneumonia J18.9    COPD (chronic obstructive pulmonary disease) ([facility] [address] J44.9    HTN (hypertension) I10    Acute hypoxemic respiratory failure (HCC) J96.01    Choledocholithiasis K80.50    Fistula, gallbladder K82.3    Difficulty swallowing R13.10    Constipation K59.00    Memory loss or impairment R41.3    History of stroke Z80.78     80-year-old female with prior stroke, hypertension, memory impairment, COPD, and choledocholithiasis with chronic gallbladder fistula is admitted for acute hypoxemic respiratory failure with sepsis due to multifocal pneumonia and UTI.     Acute hypoxemic respiratory failure with sepsis due to multifocal pneumonia and UTI, with underlying COPD  Telemetry  4 L nasal cannula oxygen (not normally on oxygen)  Sepsis bundle, trend lactic acid  Zosyn, vancomycin, and Levaquin for broad-spectrum antibiotic treatment  Follow-up blood and urine cultures  --CPT q4 hours  --Incentive spirometry  --Duonebs as needed  --Will hold off on steroids for now given absence of wheezing  --Strict I/O    Choledocholithiasis with gallbladder fistula-she was deemed not a surgical candidate and she previously pulled out a cholecystostomy tube    Difficulty swallowing  Swallow study before oral intake to evaluate for aspiration    Memory impairment with history of strokepossible dementia    Hypertensionnot on home medications    Constipationmay be a precipitating factor for UTI  Dulcolax suppository daily    DNR/DNIconfirmed by her son, who is her medical power of   I have encouraged him to place his on file with the [facility], as she was listed as a full code on arrival    Family garfieldI have spoken with her son, Anton Schulte, who lives in [facility]. I have advised him that we will have speech therapy do a swallow study to see if she can safely consume oral intake. If she does not pass her swallow study, then comfort care measures could be considered. I have placed a consult with palliative care to follow her during her hospitalization. Prophylaxis: Pepcid IV, Lovenox SQ    Estimated length of stay : 3 nights    Alex Zuleta MD  Nocturnist  ----------------------------------------------------------------------------------------------------------------------------------------------------------------------------------------------------------------  CC: Hypotension, shortness of breath       HPI:     Lisandro Gonzalez is a 80 y.o. female with prior stroke, hypertension, memory impairment, COPD, and choledocholithiasis with gallbladder fistula who presents via EMS from the [facility] at [facility] for hypotension and shortness of breath. History is unobtainable from the patient due to memory impairment. She walks with a walker. The patient is unable to distinguish the difference between being at the [facility] vs. the hospital.    Past Medical History:   Diagnosis Date    Anemia     Cerebral infarct ([facility] [address]     COPD (chronic obstructive pulmonary disease) ([facility] [address]     Essential hypertension     Gastrointestinal hemorrhage     Hyperlipidemia     Stroke ([facility] [address]     Tachycardia     TIA (transient ischemic attack)     UTI (urinary tract infection)        History reviewed. No pertinent surgical history. Unobtainable due to memory impairment    History reviewed. No pertinent family history.    Unobtainable due to memory impairment    Social History     Socioeconomic History    Marital status:    Tobacco Use    Smoking status: Unknown If Ever Smoked    Smokeless tobacco: Never Used   Substance and Sexual Activity    Alcohol use: Not Currently    Drug use: Not Currently       Prior to Admission medications    Medication Sig Start Date End Date Taking? Authorizing Provider   magnesium oxide (MAG-OX) 400 mg tablet Take 400 mg by mouth daily. Yes Rosas, MD Juan David   pantoprazole (PROTONIX) 20 mg tablet Take 20 mg by mouth daily. Yes Juan David Pillai MD   atorvastatin (LIPITOR) 40 mg tablet Take  by mouth daily. Yes Rosas, MD Juan David   escitalopram oxalate (Lexapro) 10 mg tablet Take 10 mg by mouth daily. Yes Rosas, MD Juan David   acetaminophen (TYLENOL) 325 mg tablet Take  by mouth every four (4) hours as needed for Pain. Yes Rosas, MD Juan David   food supplemt, lactose-reduced (Boost High Protein) 0.06 gram- 1 kcal/mL liqd Take  by mouth. Yes Rosas, MD Juan David   multivitamin (ONE A DAY) tablet Take 1 Tablet by mouth daily. Yes Rosas, MD Juan David   clopidogreL (PLAVIX) 75 mg tab Take  by mouth. Yes Rosas, MD Juan David   potassium chloride SA (MICRO-K) 10 mEq capsule Take 10 mEq by mouth two (2) times a day. Yes Rosas, MD Juan David   senna (Senna) 8.6 mg tablet Take 1 Tablet by mouth daily. Yes Rosas, MD Juan David   polyethylene glycol (MIRALAX) 17 gram packet Take 17 g by mouth daily.    Yes Rosas, MD Juan David       Allergies   Allergen Reactions    Latex Unknown (comments)    Codeine Unknown (comments)    Demerol [Meperidine] Unknown (comments)    Fentanyl Unknown (comments)    Fluticasone Unknown (comments)    Norco [Hydrocodone-Acetaminophen] Unknown (comments)    Percocet [Oxycodone-Acetaminophen] Unknown (comments)    Propoxyphene Unknown (comments)    Sertraline     Venlafaxine Unknown (comments)       Review of Systems  Not able to obtain due to memory impairment    Physical Exam:     Physical Exam:  Visit Vitals  /60   Pulse 100   Temp 97.3 °F (36.3 °C)   Resp 15   Ht 5' (1.524 m)   Wt 52.6 kg (116 lb)   SpO2 100%   BMI 22.65 kg/m²    O2 Flow Rate (L/min): 4 l/min O2 Device: Nasal cannula    Temp (24hrs), Av.2 °F (36.2 °C), Min:97.1 °F (36.2 °C), Max:97.3 °F (36.3 °C)    No intake/output data recorded. 01/25 0701 - 01/26 1900  In: 1100 [I.V.:1100]  Out: -     General:  Awake, elderly, frail, no distress. A&Ox1. Head:  Normocephalic, without obvious abnormality, atraumatic. Eyes:  Conjunctivae/corneas clear, sclera anicteric. Neck: Supple, symmetrical, trachea midline. Lungs:   Coarse breath sounds throughout both lung bases with poor inspiratory effort. No wheezing   Heart:  Regular rate and rhythm, S1, S2 normal, no murmur, click, rub or gallop. Abdomen: Soft, non-tender. Bowel sounds normal. No masses,  No organomegaly. Extremities: Extremities normal, atraumatic, no cyanosis or edema. Capillary refill normal.   Pulses: 2+ and symmetric all extremities. Skin: Skin color pink, turgor increased. No rashes or lesions   Neurologic: No focal motor or sensory deficit. Labs Reviewed: All lab results for the last 24 hours reviewed. Recent Results (from the past 24 hour(s))   CBC WITH AUTOMATED DIFF    Collection Time: 01/26/22  4:45 PM   Result Value Ref Range    WBC 12.0 4.6 - 13.2 K/uL    RBC 4.76 4.20 - 5.30 M/uL    HGB 12.1 12.0 - 16.0 g/dL    HCT 39.2 35.0 - 45.0 %    MCV 82.4 78.0 - 100.0 FL    MCH 25.4 24.0 - 34.0 PG    MCHC 30.9 (L) 31.0 - 37.0 g/dL    RDW 26.0 (H) 11.6 - 14.5 %    PLATELET 576 (H) 618 - 420 K/uL    MPV 9.6 9.2 - 11.8 FL    NRBC 0.0 0  WBC    ABSOLUTE NRBC 0.00 0.00 - 0.01 K/uL    NEUTROPHILS 88 (H) 40 - 73 %    LYMPHOCYTES 6 (L) 21 - 52 %    MONOCYTES 5 3 - 10 %    EOSINOPHILS 0 0 - 5 %    BASOPHILS 0 0 - 2 %    IMMATURE GRANULOCYTES 1 (H) 0.0 - 0.5 %    ABS. NEUTROPHILS 10.6 (H) 1.8 - 8.0 K/UL    ABS. LYMPHOCYTES 0.7 (L) 0.9 - 3.6 K/UL    ABS. MONOCYTES 0.6 0.05 - 1.2 K/UL    ABS. EOSINOPHILS 0.0 0.0 - 0.4 K/UL    ABS. BASOPHILS 0.0 0.0 - 0.1 K/UL    ABS. IMM.  GRANS. 0.1 (H) 0.00 - 0.04 K/UL    DF AUTOMATED      PLATELET COMMENTS SLIDE ESTIMATE CONFIRMS PLT COUNT      RBC COMMENTS ACANTHOCYTES  2+  ANISOCYTOSIS  3+        RBC COMMENTS RBC FRAGMENTS  FEW       PROTHROMBIN TIME + INR    Collection Time: 01/26/22  4:45 PM   Result Value Ref Range    Prothrombin time 13.5 11.5 - 15.2 sec    INR 1.1 0.8 - 1.2     METABOLIC PANEL, COMPREHENSIVE    Collection Time: 01/26/22  4:45 PM   Result Value Ref Range    Sodium 142 136 - 145 mmol/L    Potassium 4.7 3.5 - 5.5 mmol/L    Chloride 106 100 - 111 mmol/L    CO2 24 21 - 32 mmol/L    Anion gap 12 3.0 - 18 mmol/L    Glucose 111 (H) 74 - 99 mg/dL    BUN 13 7.0 - 18 MG/DL    Creatinine 1.04 0.6 - 1.3 MG/DL    BUN/Creatinine ratio 13 12 - 20      GFR est AA >60 >60 ml/min/1.73m2    GFR est non-AA 50 (L) >60 ml/min/1.73m2    Calcium 9.5 8.5 - 10.1 MG/DL    Bilirubin, total 0.8 0.2 - 1.0 MG/DL    ALT (SGPT) 87 (H) 13 - 56 U/L    AST (SGOT) 89 (H) 10 - 38 U/L    Alk.  phosphatase 121 (H) 45 - 117 U/L    Protein, total 6.6 6.4 - 8.2 g/dL    Albumin 2.2 (L) 3.4 - 5.0 g/dL    Globulin 4.4 (H) 2.0 - 4.0 g/dL    A-G Ratio 0.5 (L) 0.8 - 1.7     TROPONIN-HIGH SENSITIVITY    Collection Time: 01/26/22  4:45 PM   Result Value Ref Range    Troponin-High Sensitivity 29 0 - 54 ng/L   LIPASE    Collection Time: 01/26/22  4:45 PM   Result Value Ref Range    Lipase 69 (L) 73 - 393 U/L   MAGNESIUM    Collection Time: 01/26/22  4:45 PM   Result Value Ref Range    Magnesium 2.2 1.6 - 2.6 mg/dL   URINALYSIS W/ RFLX MICROSCOPIC    Collection Time: 01/26/22  4:45 PM   Result Value Ref Range    Color YELLOW      Appearance CLEAR      Specific gravity 1.007 1.005 - 1.030      pH (UA) 7.0 5.0 - 8.0      Protein Negative NEG mg/dL    Glucose Negative NEG mg/dL    Ketone Negative NEG mg/dL    Bilirubin Negative NEG      Blood Negative NEG      Urobilinogen 0.2 0.2 - 1.0 EU/dL    Nitrites Negative NEG      Leukocyte Esterase MODERATE (A) NEG     URINE MICROSCOPIC ONLY    Collection Time: 01/26/22  4:45 PM   Result Value Ref Range    WBC 21 to 35 0 - 5 /hpf    RBC 0 to 3 0 - 5 /hpf Epithelial cells FEW 0 - 5 /lpf    Bacteria 2+ (A) NEG /hpf   EKG, 12 LEAD, INITIAL    Collection Time: 01/26/22  5:10 PM   Result Value Ref Range    Ventricular Rate 139 BPM    Atrial Rate 139 BPM    P-R Interval 124 ms    QRS Duration 60 ms    Q-T Interval 296 ms    QTC Calculation (Bezet) 450 ms    Calculated P Axis 49 degrees    Calculated R Axis -13 degrees    Calculated T Axis 49 degrees    Diagnosis       Sinus tachycardia with premature supraventricular complexes  Low voltage QRS  Septal infarct , age undetermined  Abnormal ECG  No previous ECGs available     POC LACTIC ACID    Collection Time: 01/26/22  5:16 PM   Result Value Ref Range    Lactic Acid (POC) 5.08 (HH) 0.40 - 2.00 mmol/L   COVID-19 RAPID TEST    Collection Time: 01/26/22  5:49 PM   Result Value Ref Range    Specimen source Nasopharyngeal      COVID-19 rapid test Not detected NOTD     POC LACTIC ACID    Collection Time: 01/26/22  5:54 PM   Result Value Ref Range    Lactic Acid (POC) 4.61 (HH) 0.40 - 2.00 mmol/L       Results  CT CHEST ABD PELV W CONT (Accession [id number]) (Order [id number])    Allergies       Not Specified: Latex; Codeine;  Demerol [Meperidine]; Fentanyl;  Fluticasone;  Norco [Hydrocodone-acetaminophen]; Percocet [Oxycodone-acetaminophen]; Propoxyphene;  Sertraline;  Venlafaxine     Exam Information    Status Exam Begun  Exam Ended    Final [99] 1/26/2022 19:25 1/26/2022  9:23 PM [id number]  9:23 PM     Result Information    Status: Final result (Exam End: 1/26/2022 21:23) Provider Status: Open       CT CHEST ABD PELV W CONT: Patient Communication     Released  Not seen     Study Result    Narrative & Impression   EXAM:CT chest abdomen and pelvis with contrast     CLINICAL INDICATION/HISTORY: Sepsis, hypotension     COMPARISON: None     TECHNIQUE:   Multislice helical CT was performed through the chest, abdomen and pelvis with  IV contrast. Coronal and sagittal reformations were generated.  One or more dose  reduction techniques were used on this CT: automated exposure control,  adjustment of the mAs and/or kVp according to patient's size, and iterative  reconstruction techniques. The specific techniques utilized on this CT exam have  been documented in the patient's electronic medical record. Digital imaging and  communications and medicine (DICOM) format image data are available to  nonaffiliated external healthcare facilities or entities on a secure, media  free, reciprocally searchable basis with patient authorization for at least a 12  month period after this study.        ==========     FINDINGS:     ------------------     CHEST:     LUNGS: There is streaky and patchy opacities throughout the left lower lobe most  pronounced basilar segments and mild patchy opacity basilar right lower lobe. Streaky parenchymal density middle lobe and lingula and mild additional  peripheral chronic fibrotic changes with mild biapical scarring. No abnormal  mass.     PLEURA: Minimal left pleural effusion.     AIRWAY: Mild bilateral lower lobe bronchiectasis.     MEDIASTINUM: Normal heart and great vessels. No adenopathy.     OTHER: Multiple chronic appearing left-sided rib fractures. No acute or  aggressive osseous finding.     ------------------     ABDOMEN/PELVIS:     LIVER/BILIARY: No suspicious liver lesion. No biliary dilation. Gallbladder  unremarkable.     SPLEEN: Normal.     PANCREAS: Normal.     ADRENALS: Normal.     KIDNEYS: Small nonobstructing bilateral renal calculi. No abnormal mass or  obstruction. Benign-appearing left lower pole renal cyst. Ureters and bladder  unremarkable.     LYMPH NODES: No technically enlarged nodes.     GI TRACT: Mildly distended stool-filled rectum with nonspecific air-fluid levels  within colon without significant dilatation or wall thickening. No abnormal  small bowel dilatation or wall thickening.  No ascites or free air.     VASCULATURE: Moderate aortoiliac atherosclerotic calcification.     PELVIC ORGANS: Previous hysterectomy.     OTHER: No aggressive appearing osseous lesion. Multilevel lower lumbar  degenerative spondylosis.     ==========     IMPRESSION     1. Bilateral lower lobe pneumonia left greater than right with minimal left  pleural effusion with bilateral lower lobe bronchiectasis. Additional mild areas  of atelectasis or scarring bilaterally.     2. Mildly distended stool-filled rectum likely related to constipation with no  evidence of obstruction.     3. No other acute or significant abdominal or pelvic CT finding. Results  XR CHEST PORT (Accession [id number]) (Order [id number])    Allergies       Not Specified: Latex; Codeine;  Demerol [Meperidine]; Fentanyl;  Fluticasone;  Norco [Hydrocodone-acetaminophen]; Percocet [Oxycodone-acetaminophen]; Propoxyphene;  Sertraline;  Venlafaxine     Exam Information    Status Exam Begun  Exam Ended    Final [99] 1/26/2022 16:51 1/26/2022  5:25 PM [id number]  5:25 PM     Result Information    Status: Final result (Exam End: 1/26/2022 17:25) Provider Status: Open       XR CHEST PORT: Patient Communication     Released  Not seen     Study Result    Narrative & Impression   EXAM: Portable frontal view of the chest.     CLINICAL INDICATION/HISTORY: Hypotension     COMPARISON: None.     _______________     FINDINGS:      Normal heart size with no mediastinal mass. Cardiac device overlying left side  heart. There is some hazy abnormal parenchymal density left lung base with  slight blunting left lateral costophrenic angle. No other lung consolidation or  mass. Multiple chronic appearing left-sided rib fractures.     _______________     IMPRESSION     1. FINDINGS suggestive of pneumonia and/or atelectasis left lower lung, possible  small left pleural effusion.

## 2022-01-28 LAB
ALBUMIN SERPL-MCNC: 3.2 G/DL (ref 3.4–5)
ALBUMIN/GLOB SERPL: 1.6 {RATIO} (ref 0.8–1.7)
ALP SERPL-CCNC: 50 U/L (ref 45–117)
ALT SERPL-CCNC: 41 U/L (ref 13–56)
ANION GAP SERPL CALC-SCNC: 9 MMOL/L (ref 3–18)
AST SERPL-CCNC: 37 U/L (ref 10–38)
BILIRUB SERPL-MCNC: 0.6 MG/DL (ref 0.2–1)
BUN SERPL-MCNC: 6 MG/DL (ref 7–18)
BUN/CREAT SERPL: 12 (ref 12–20)
CALCIUM SERPL-MCNC: 8 MG/DL (ref 8.5–10.1)
CHLORIDE SERPL-SCNC: 113 MMOL/L (ref 100–111)
CO2 SERPL-SCNC: 24 MMOL/L (ref 21–32)
CREAT SERPL-MCNC: 0.5 MG/DL (ref 0.6–1.3)
ERYTHROCYTE [DISTWIDTH] IN BLOOD BY AUTOMATED COUNT: 24.8 % (ref 11.6–14.5)
GLOBULIN SER CALC-MCNC: 2 G/DL (ref 2–4)
GLUCOSE SERPL-MCNC: 84 MG/DL (ref 74–99)
HCT VFR BLD AUTO: 22.2 % (ref 35–45)
HGB BLD-MCNC: 6.8 G/DL (ref 12–16)
MAGNESIUM SERPL-MCNC: 1.6 MG/DL (ref 1.6–2.6)
MCH RBC QN AUTO: 25.2 PG (ref 24–34)
MCHC RBC AUTO-ENTMCNC: 30.6 G/DL (ref 31–37)
MCV RBC AUTO: 82.2 FL (ref 78–100)
NRBC # BLD: 0 K/UL (ref 0–0.01)
NRBC BLD-RTO: 0 PER 100 WBC
PLATELET # BLD AUTO: 253 K/UL (ref 135–420)
PMV BLD AUTO: 10 FL (ref 9.2–11.8)
POTASSIUM SERPL-SCNC: 2.9 MMOL/L (ref 3.5–5.5)
PROT SERPL-MCNC: 5.2 G/DL (ref 6.4–8.2)
RBC # BLD AUTO: 2.7 M/UL (ref 4.2–5.3)
SODIUM SERPL-SCNC: 146 MMOL/L (ref 136–145)
VANCOMYCIN SERPL-MCNC: 29.4 UG/ML (ref 5–40)
WBC # BLD AUTO: 5.1 K/UL (ref 4.6–13.2)

## 2022-01-28 PROCEDURE — 74011250636 HC RX REV CODE- 250/636: Performed by: HOSPITALIST

## 2022-01-28 PROCEDURE — 85027 COMPLETE CBC AUTOMATED: CPT

## 2022-01-28 PROCEDURE — 74011250636 HC RX REV CODE- 250/636: Performed by: FAMILY MEDICINE

## 2022-01-28 PROCEDURE — 74011000258 HC RX REV CODE- 258: Performed by: EMERGENCY MEDICINE

## 2022-01-28 PROCEDURE — 97530 THERAPEUTIC ACTIVITIES: CPT

## 2022-01-28 PROCEDURE — 92526 ORAL FUNCTION THERAPY: CPT

## 2022-01-28 PROCEDURE — 74011250636 HC RX REV CODE- 250/636: Performed by: EMERGENCY MEDICINE

## 2022-01-28 PROCEDURE — P9047 ALBUMIN (HUMAN), 25%, 50ML: HCPCS | Performed by: HOSPITALIST

## 2022-01-28 PROCEDURE — 83735 ASSAY OF MAGNESIUM: CPT

## 2022-01-28 PROCEDURE — 97166 OT EVAL MOD COMPLEX 45 MIN: CPT

## 2022-01-28 PROCEDURE — 80053 COMPREHEN METABOLIC PANEL: CPT

## 2022-01-28 PROCEDURE — 74011000250 HC RX REV CODE- 250: Performed by: FAMILY MEDICINE

## 2022-01-28 PROCEDURE — 36415 COLL VENOUS BLD VENIPUNCTURE: CPT

## 2022-01-28 PROCEDURE — 65660000000 HC RM CCU STEPDOWN

## 2022-01-28 PROCEDURE — 74011250637 HC RX REV CODE- 250/637: Performed by: HOSPITALIST

## 2022-01-28 PROCEDURE — 97162 PT EVAL MOD COMPLEX 30 MIN: CPT

## 2022-01-28 PROCEDURE — 80202 ASSAY OF VANCOMYCIN: CPT

## 2022-01-28 RX ORDER — LOPERAMIDE HYDROCHLORIDE 2 MG/1
2 CAPSULE ORAL ONCE
Status: COMPLETED | OUTPATIENT
Start: 2022-01-28 | End: 2022-01-28

## 2022-01-28 RX ORDER — MAGNESIUM SULFATE HEPTAHYDRATE 40 MG/ML
2 INJECTION, SOLUTION INTRAVENOUS ONCE
Status: COMPLETED | OUTPATIENT
Start: 2022-01-28 | End: 2022-01-28

## 2022-01-28 RX ORDER — POTASSIUM CHLORIDE 7.45 MG/ML
10 INJECTION INTRAVENOUS
Status: COMPLETED | OUTPATIENT
Start: 2022-01-28 | End: 2022-01-29

## 2022-01-28 RX ADMIN — Medication 500 MG: at 22:31

## 2022-01-28 RX ADMIN — SODIUM CHLORIDE, PRESERVATIVE FREE 10 ML: 5 INJECTION INTRAVENOUS at 14:00

## 2022-01-28 RX ADMIN — POTASSIUM CHLORIDE 10 MEQ: 7.46 INJECTION, SOLUTION INTRAVENOUS at 07:44

## 2022-01-28 RX ADMIN — POTASSIUM CHLORIDE 10 MEQ: 7.46 INJECTION, SOLUTION INTRAVENOUS at 08:05

## 2022-01-28 RX ADMIN — VANCOMYCIN HYDROCHLORIDE 750 MG: 750 INJECTION, POWDER, LYOPHILIZED, FOR SOLUTION INTRAVENOUS at 11:46

## 2022-01-28 RX ADMIN — POTASSIUM CHLORIDE 10 MEQ: 7.46 INJECTION, SOLUTION INTRAVENOUS at 06:24

## 2022-01-28 RX ADMIN — MAGNESIUM SULFATE HEPTAHYDRATE 2 G: 40 INJECTION, SOLUTION INTRAVENOUS at 07:07

## 2022-01-28 RX ADMIN — VANCOMYCIN HYDROCHLORIDE 750 MG: 750 INJECTION, POWDER, LYOPHILIZED, FOR SOLUTION INTRAVENOUS at 00:20

## 2022-01-28 RX ADMIN — ALBUMIN (HUMAN) 25 G: 0.25 INJECTION, SOLUTION INTRAVENOUS at 09:18

## 2022-01-28 RX ADMIN — ALBUMIN (HUMAN) 25 G: 0.25 INJECTION, SOLUTION INTRAVENOUS at 22:30

## 2022-01-28 RX ADMIN — ALBUMIN (HUMAN) 25 G: 0.25 INJECTION, SOLUTION INTRAVENOUS at 14:01

## 2022-01-28 RX ADMIN — SODIUM CHLORIDE, PRESERVATIVE FREE 10 ML: 5 INJECTION INTRAVENOUS at 22:31

## 2022-01-28 RX ADMIN — ENOXAPARIN SODIUM 40 MG: 100 INJECTION SUBCUTANEOUS at 09:18

## 2022-01-28 RX ADMIN — PIPERACILLIN AND TAZOBACTAM 4.5 G: 4; .5 INJECTION, POWDER, LYOPHILIZED, FOR SOLUTION INTRAVENOUS; PARENTERAL at 18:16

## 2022-01-28 RX ADMIN — Medication 500 MG: at 09:18

## 2022-01-28 RX ADMIN — FAMOTIDINE 20 MG: 10 INJECTION, SOLUTION INTRAVENOUS at 09:18

## 2022-01-28 RX ADMIN — LOPERAMIDE HYDROCHLORIDE 2 MG: 2 CAPSULE ORAL at 14:01

## 2022-01-28 RX ADMIN — PIPERACILLIN AND TAZOBACTAM 4.5 G: 4; .5 INJECTION, POWDER, LYOPHILIZED, FOR SOLUTION INTRAVENOUS; PARENTERAL at 11:46

## 2022-01-28 RX ADMIN — PIPERACILLIN AND TAZOBACTAM 4.5 G: 4; .5 INJECTION, POWDER, LYOPHILIZED, FOR SOLUTION INTRAVENOUS; PARENTERAL at 05:25

## 2022-01-28 NOTE — PROGRESS NOTES
Problem: Mobility Impaired (Adult and Pediatric)  Goal: *Acute Goals and Plan of Care (Insert Text)  Description: Physical Therapy Goals  Initiated 1/28/2022 and to be accomplished within 7 day(s)  1. Patient will move from supine to sit and sit to supine  in bed with moderate assistance . 2.  Patient will transfer from bed to chair and chair to bed with moderate assistance  using the least restrictive device. 3.  Patient will perform sit to stand with moderate assistance . 4. Patient will ambulate with moderate assistance  for 3 feet with the least restrictive device. PLOF: pt resides at the [facility], unclear if patient was ambulatory     Outcome: Progressing Towards Goal   PHYSICAL THERAPY EVALUATION    Patient: Manuela Ho (2 y.o. female)  Date: 1/28/2022  Primary Diagnosis: Sepsis ([facility] [address] [A41.9]        Precautions: fall, decreased safety awareness       PLOF: see above    ASSESSMENT :  Based on the objective data described below, the patient presents with decreased strength, balance and activity tolerance resulting in decreased independence with functional mobility. Pt with fluctuations in answering questions and following commands, unable to determine if this is intentional or not. Pt transferred supine to sit with max A and required constant support to remain sitting due to heavy posterior lean. Pt sat approximately 25 minutes at the edge of bed while performing UE tasks. Pt returned to supine with max A. Pt rolled side to side in bed with min A in order for brief and pads under her to be changed. Pt was left in bed with needs in reach and nursing notified. Patient will benefit from skilled intervention to address the above impairments.   Patient's rehabilitation potential is considered to be Guarded  Factors which may influence rehabilitation potential include:   []         None noted  [x]         Mental ability/status  [x]         Medical condition  [] Home/family situation and support systems  [x]         Safety awareness  []         Pain tolerance/management  []         Other:      PLAN :  Recommendations and Planned Interventions:   [x]           Bed Mobility Training             [x]    Neuromuscular Re-Education  [x]           Transfer Training                   []    Orthotic/Prosthetic Training  [x]           Gait Training                          []    Modalities  [x]           Therapeutic Exercises           []    Edema Management/Control  [x]           Therapeutic Activities            []    Family Training/Education  []           Patient Education  []           Other (comment):    Frequency/Duration: Patient will be followed by physical therapy 1-2 times per day/4-7 days per week to address goals. Discharge Recommendations: [facility] versus [facility] when PLOF can be verified  Further Equipment Recommendations for Discharge: N/A     SUBJECTIVE:   Patient stated Niesha Zieglerrose.   when asked if she had any pain    OBJECTIVE DATA SUMMARY:     Past Medical History:   Diagnosis Date    Anemia     Cerebral infarct (HCC)     COPD (chronic obstructive pulmonary disease) (HCC)     Essential hypertension     Gastrointestinal hemorrhage     Hyperlipidemia     Stroke (HCC)     Tachycardia     TIA (transient ischemic attack)     UTI (urinary tract infection)    History reviewed. No pertinent surgical history. Barriers to Learning/Limitations: yes;  altered mental status (i.e.Sedation, Confusion)  Compensate with: Visual Cues and Tactile Cues  Home Situation:  Home Situation  Support Systems: Child(feliberto)  Patient Expects to be Discharged to[de-identified] Long Term Care  Critical Behavior:  Neurologic State: Drowsy; Lethargic  Orientation Level: Oriented to person  Cognition: Decreased attention/concentration;Decreased command following  Safety/Judgement: Lack of insight into deficits     Strength:     Unable to formally MMT due to decreased command following, moves LEs when she wants to      Tone & Sensation:   Tone: Normal       Range Of Motion:  AROM: Generally decreased, functional      Functional Mobility:  Bed Mobility:  Rolling: Minimum assistance  Supine to Sit: Maximum assistance  Sit to Supine: Maximum assistance     Transfers:  Sit to Stand:  (unable to stand with max A)     Balance:   Sitting - Static: Poor (constant support)     Pain:  Pain level pre-treatment: 3/10   Pain level post-treatment: 3/10   Pain Intervention(s) : Rest, Repositioning  Response to intervention: Nurse notified, See doc flow    Activity Tolerance:   Poor+  Please refer to the flowsheet for vital signs taken during this treatment. After treatment:   []         Patient left in no apparent distress sitting up in chair  [x]         Patient left in no apparent distress in bed  [x]         Call bell left within reach  [x]         Nursing notified  []         Caregiver present  [x]         Bed alarm activated  []         SCDs applied    COMMUNICATION/EDUCATION:   [x]         Role of Physical Therapy in the acute care setting. [x]         Fall prevention education was provided and the patient/caregiver indicated understanding. [x]         Patient/family have participated as able in goal setting and plan of care. [x]         Patient/family agree to work toward stated goals and plan of care. []         Patient understands intent and goals of therapy, but is neutral about his/her participation. []         Patient is unable to participate in goal setting/plan of care: ongoing with therapy staff.  []         Other:     Thank you for this referral.  Gume Briseno, PT   Time Calculation: 47 mins      Eval Complexity: History: HIGH Complexity :3+ comorbidities / personal factors will impact the outcome/ POC Exam:MEDIUM Complexity : 3 Standardized tests and measures addressing body structure, function, activity limitation and / or participation in recreation  Presentation: MEDIUM Complexity : Evolving with changing characteristics  Clinical Decision Making:Medium Complexity    Overall Complexity:MEDIUM

## 2022-01-28 NOTE — PROGRESS NOTES
SPEECH LANGUAGE PATHOLOGY DYSPHAGIA TREATMENT    Patient: Shyam Shannon (39 y.o. female)  Date: 1/28/2022  Diagnosis: Sepsis ([facility] [A41.9] Sepsis ([facility]       Precautions: Fall, Aspiration   PLOF: History of CVA, PNA    ASSESSMENT:  Pt seen this day for ST to assess diet tolerance and safety. Pt seated upright in bed but appeared drowsy and confused. Did not respond to questions about previous diet. Pt's breakfast tray was presented to her; however, pt unable to manage food independently on this day. Pt demo >10 PO trials of thins via straw with no overt s/sx of aspiration. Pt largely refused minced/moist foods on tray; accepted potatoes. Pt continues to exhibit disordered mastication and requires liquid wash to clear oral cavity after each small bite. Pt became increasingly fatigued with chewing and started to expel minced/moist textures after ~15 minutes. Would recommend downgrade to puree diet at this time due to limited intake and disordered chewing. D/w nurse Ayla Ogden and Dr. Siddhartha Fatima.    Progression toward goals:  []         Improving appropriately and progressing toward goals  [x]         Improving slowly and progressing toward goals  []         Not making progress toward goals and plan of care will be adjusted     PLAN:  Recommendations and Planned Interventions:  See above  Patient continues to benefit from skilled intervention to address the above impairments. Continue treatment per established plan of care.   Discharge Recommendations:  Skilled Nursing Facility     SUBJECTIVE:   Patient stated I can't hear you honey    OBJECTIVE:   Cognitive and Communication Status:  Neurologic State: Nova Reek  Orientation Level: Oriented to person  Cognition: Decreased attention/concentration,Decreased command following  Perception: Verbal,Visual  Perseveration: No perseveration noted  Safety/Judgement: Lack of insight into deficits  Dysphagia Treatment:  Oral Assessment:  Oral Assessment  Labial: No impairment  Dentition: Limited  Oral Hygiene:  (fair)  Lingual: Decreased rate,Decreased strength  Velum: Unable to visualize  Mandible: No impairment  Gag Reflex:  (did not assess)  P.O. Trials:   Patient Position:  (upright in bed)   Vocal quality prior to P.O.: Low volume   Consistency Presented: Thin liquid,Other (comment) (moist/minced)   How Presented: SLP-fed/presented,Straw    Bolus Acceptance: Other (comment) (Impaired with minced/moist textures)   Bolus Formation/Control: Impaired   Type of Impairment: Lip closure,Mastication,Piecemeal   Propulsion: Delayed (# of seconds),Discoordination   Oral Residue: Greater than 50% of bolus,Lingual   Initiation of Swallow: Delayed (# of seconds)   Laryngeal Elevation: Functional   Aspiration Signs/Symptoms: None   Pharyngeal Phase Characteristics: Easily fatigued ,Poor endurance   Effective Modifications: Alternate liquids/solids,Straw,Small sips and bites   Cues for Modifications: Minimal-moderate                  Oral Phase Severity: Mild-moderate   Pharyngeal Phase Severity : No impairment    PAIN:  Pain level pre-treatment: 0/10   Pain level post-treatment: 0/10       After treatment:   []              Patient left in no apparent distress sitting up in chair  [x]              Patient left in no apparent distress in bed  []              Call bell left within reach  [x]              Nursing notified  []              Family present  []              Caregiver present  []              Bed alarm activated      COMMUNICATION/EDUCATION:   [x] Aspiration precautions; swallow safety; compensatory techniques  [x]        Patient unable to participate in education; education ongoing with staff  []  Posted safety precautions in patient's room.   [] Oral-motor/laryngeal strengthening exercises      DIOGO Nguyễn  Treatment Time 20 minutes

## 2022-01-28 NOTE — PROGRESS NOTES
Problem: Self Care Deficits Care Plan (Adult)  Goal: *Acute Goals and Plan of Care (Insert Text)  Description: Occupational Therapy Goals  Initiated 1/28/2022 within 7 day(s). 1.  Patient will perform self-feeding following SLP recs with supervision/ set-up. 2.  Patient will perform grooming with supervision/ set-up  3. Patient will perform bed mobility in preparation for selfcare with minimal assistance/contact guard assistance. 4.  Patient will perform upper body dressing with minimal assistance/contact guard assistance. 5.  Patient will follow 95% of commands during ADLs with minimal verbal cues. 6.  Patient will participate in upper extremity therapeutic exercise/activities with supervision/set-up for 3-5minutes to increase ROM/strength for selfcare tasks. 7.  Patient will perform functional activity sitting EOB for 2-4 minutes with supervision/set-up, F+ balance. Prior Level of Function: Patient nonverbal, unable to give PLOF this session. Per chart review patient from [address] [place] [address]  Outcome: Progressing Towards Goal       OCCUPATIONAL THERAPY EVALUATION    Patient: Sheng Pelayo (30 y.o. female)  Date: 1/28/2022  Primary Diagnosis: Sepsis ([facility] [address] [facility] [A41.9]  Precautions: Fall,Skin      ASSESSMENT :  Upon entering the room, the patient was supine in bed, eyes closed but opening spontaneously throughout session. Pt's  from [facility] in [place] present at bedside and observed for patient's participation in [place] evaluation. Patient lethargic throughout with eyes closed, but following most commands. Patient nonverbal this session. Patient educated on the importance of shoulder flex/ext, elbow flex/ext, wrist flex/ext and ulnar and radial deviation, as well as active finger and hand movement to prevent edema and maintain function. Patient able to tolerate AAROM of BUE to perform exercises.  Patient minimal hand over hand assist for grooming task this session and maximal assistance for rolling in bed in preparation for pericare. Per PT note this morning, patient able to tolerate EOB sitting and required less assistance for bed mobility, patient performance most likely due to lethargy this afternoon. Based on the objective data described below, the patient presents with decreased strength, decreased independence, decreased activity tolerance, decreased AROM, and decreased functional mobility, which impedes pt performance in basic self-care/ADL tasks. Patient would benefit from skilled OT to restore PLOF and maximize function. Patient will benefit from skilled intervention to address the above impairments. Patient's rehabilitation potential is considered to be Fair  Factors which may influence rehabilitation potential include:   []             None noted  [x]             Mental ability/status  [x]             Medical condition  [x]             Home/family situation and support systems  [x]             Safety awareness  []             Pain tolerance/management  []             Other:      PLAN :  Recommendations and Planned Interventions:   [x]               Self Care Training                  [x]      Therapeutic Activities  [x]               Functional Mobility Training   [x]      Cognitive Retraining  [x]               Therapeutic Exercises           [x]      Endurance Activities  [x]               Balance Training                    [x]      Neuromuscular Re-Education  [x]               Visual/Perceptual Training     [x]      Home Safety Training  [x]               Patient Education                   [x]      Family Training/Education  []               Other (comment):    Frequency/Duration: Patient will be followed by occupational therapy 3 - 6 times a week to address goals.   Discharge Recommendations: Skilled Nursing Facility/LTC  Further Equipment Recommendations for Discharge: hospital bed, mechanical lift, and wheelchair      SUBJECTIVE:   Patient nonverbal    OBJECTIVE DATA SUMMARY: Past Medical History:   Diagnosis Date    Anemia     Cerebral infarct (HCC)     COPD (chronic obstructive pulmonary disease) (HCC)     Essential hypertension     Gastrointestinal hemorrhage     Hyperlipidemia     Stroke (HCC)     Tachycardia     TIA (transient ischemic attack)     UTI (urinary tract infection)    History reviewed. No pertinent surgical history. Barriers to Learning/Limitations: yes;  sensory deficits-vision/hearing/speech  Compensate with: visual, verbal, tactile, kinesthetic cues/model    Home Situation:   Home Situation  Support Systems: Child(feliberto)  Patient Expects to be Discharged to[de-identified] [facility]  []  Right hand dominant   []  Left hand dominant    Cognitive/Behavioral Status:  Neurologic State: Eyes open spontaneously; Lethargic  Orientation Level: Other (Comment) (pt did not respond when asked)  Cognition: Decreased command following;Decreased attention/concentration  Safety/Judgement: Lack of insight into deficits    Skin: Intact; dressing observed on L forearm with IV placement  Edema: None noted    Vision/Perceptual:    Tracking: Able to track stimulus in all quadrants w/o difficulty      Coordination: BUE  Coordination: Generally decreased, functional  Fine Motor Skills-Upper: Left Intact; Right Intact    Gross Motor Skills-Upper: Left Impaired;Right Impaired    Balance:  Sitting - Static: Poor (constant support)    Strength: BUE  Strength: Generally decreased, functional     Tone & Sensation: BUE  Tone: Normal  Sensation:  (unable to formally assess)       Range of Motion: BUE  AROM: Generally decreased, functional  PROM: Within functional limits       Functional Mobility and Transfers for ADLs:  Bed Mobility:  Rolling: Maximum assistance (rolling to L side for repositioning)  Supine to Sit: Maximum assistance  Sit to Supine: Maximum assistance    Transfers:  Sit to Stand:  (NT as pt too lethargic at this time)      ADL Assessment:   Feeding: Contact guard assistance    Oral Facial Hygiene/Grooming: Minimum assistance    Bathing: Maximum assistance    Upper Body Dressing: Moderate assistance;Maximum assistance    Lower Body Dressing: Maximum assistance; Total assistance    Toileting: Maximum assistance; Total assistance      ADL Intervention:  Grooming  Grooming Assistance: Minimum assistance  Position Performed:  (semi reclined)  Washing Face: Minimum assistance    Cognitive Retraining  Safety/Judgement: Lack of insight into deficits    Pain:  No facial/verbal indicators of pain noted this session  Pain Intervention(s): Medication (see MAR); Response to intervention: Nurse notified, See doc flow    Activity Tolerance:   Patient demonstrated decreased activity tolerance during OT session. Please refer to the flowsheet for vital signs taken during this treatment. After treatment:   [] Patient left in no apparent distress sitting up in chair  [x] Patient left in no apparent distress in bed  [x] Call bell left within reach  [x] Nursing notified  [x] Visitor present  [x] Bed alarm activated    COMMUNICATION/EDUCATION:   [x] Role of Occupational Therapy in the acute care setting  [] Home safety education was provided and the patient/caregiver indicated understanding. [] Patient/family have participated as able in goal setting and plan of care. [] Patient/family agree to work toward stated goals and plan of care. [] Patient understands intent and goals of therapy, but is neutral about his/her participation. [] Patient is unable to participate in goal setting and plan of care. Thank you for this referral.  PONCE Huntley/L  Time Calculation: 8 mins    Eval Complexity: History: MEDIUM Complexity : Expanded review of history including physical, cognitive and psychosocial  history ; Examination: HIGH Complexity : 5 or more performance deficits relating to physical, cognitive , or psychosocial skils that result in activity limitations and / or participation restrictions;    Decision Making:HIGH Complexity : Patient presents with comorbidities that affect occupational performance.  Signifigant modification of tasks or assistance (eg, physical or verbal) with assessment (s) is necessary to enable patient to complete evaluation

## 2022-01-28 NOTE — PROGRESS NOTES
[address] [facility] Pharmacokinetic Monitoring Service - Vancomycin      Alexia Mitchell is a 80 y.o. female starting on vancomycin therapy for Sepsis of Unknown Etiology. Pharmacy consulted by Dr. Mary Rooney for monitoring and adjustment. Target Concentration: Goal AUC/SANJAY 400-600 mg*hr/L     Additional Antimicrobials: Levofloxacin, Zosyn     Pertinent Laboratory Values: Wt Readings from Last 1 Encounters:   01/27/22 52.6 kg (116 lb)          Temp Readings from Last 1 Encounters:   01/28/22 97.7 °F       No components found for: PROCAL  Estimated Creatinine Clearance: 41.4 mL/min (based on SCr of 0.50 mg/dL). Recent Labs     01/28/22     WBC 5.1         Plan:    Adjust Vancomycin to 750 mg IV Q 24  hours until next random draw  Random Trough at 0020 on 1/28/22 = 29.4 mcg/ml  Requested another random sample. Blood drawn at [address] on 1/28/22 instead of the requested 0700 time.  May not be a true value  Next random draw will be 1/29/22 at 1000  Estimated  mg/l.hr  Trough 13.9 mg/l  Pharmacy will continue to follow and adjust.     Adan Dinero  [phone]

## 2022-01-28 NOTE — PROGRESS NOTES
Physician Progress Note      Karen Martinez  CSN #:                  [id number]  :                       1935  ADMIT DATE:       2022 4:40 PM  [address] [place] DATE:  RESPONDING  PROVIDER #:        David Ritchie MD          QUERY TEXT:    Patient admitted with Sepsis  due to multifocal pneumonia and UTI. Noted documentation of sepsis in H+P and subsequent MD PN . In order to support the diagnosis of sepsis, please include additional clinical indicators in your documentation as well as  acute organ dysfunction if appropriate . Or please document if the diagnosis of sepsis has been ruled out after further study. The medical record reflects the following:    Risk Factors: prior stroke, hypertension, memory impairment, COPD, and choledocholithiasis with chronic gallbladder fistula is admitted for acute hypoxemic respiratory failure with sepsis due to multifocal pneumonia and UTI. ? Clinical Indicators: IN ED for hypotension and shortness of breath dx  sepsis due to multifocal pneumonia and UTI.  1st 24 hr VS bp 78/42--143 pulse --RR 29 and Labs Lactic acid 5.08 ,Neuts 88 ,CRP 1,9 ,  CXR FINDINGS suggestive of pneumonia and/or atelectasis left lower lung, possible small left pleural effusion. Treatment: Sepsis bundle, trend lactic acid IV Zosyn, vancomycin, and Levaquin ,Labs carefully monitored  Thank you  [email or web address]  Options provided:  -- Sepsis present as evidenced by, Please document evidence. -- Sepsis was ruled out after study  -- Other - I will add my own diagnosis  -- Disagree - Not applicable / Not valid  -- Disagree - Clinically unable to determine / Unknown  -- Refer to Clinical Documentation Reviewer    PROVIDER RESPONSE TEXT:    Sepsis is present as evidenced by lactic acid elevation, pneumonia on CXR    Query created by:  Silvia Luna on 2022 4:42 PM      Electronically signed by:  David Ritchie MD 1/27/2022 8:56 PM

## 2022-01-28 NOTE — PROGRESS NOTES
Hospitalist Progress Note-critical care note     Patient: Ambar Alba MRN: [id number]  CSN: [id number]    YOB: 1935  Age: 80 y.o.   Sex: female    DOA: 1/26/2022 LOS:  LOS: 2 days            Chief complaint: sepsis, pna, uti, htn ,respiratory failure with hypoxia      Assessment/Plan         Hospital Problems  Date Reviewed: 1/27/2022          Codes Class Noted POA    Memory loss or impairment ICD-10-CM: R41.3  ICD-9-CM: 780.93  1/27/2022 Yes        History of stroke ICD-10-CM: Z86.73  ICD-9-CM: V12.54  1/27/2022 Yes        Hypokalemia ICD-10-CM: E87.6  ICD-9-CM: 276.8  1/27/2022 Unknown        Hypoalbuminemia ICD-10-CM: E88.09  ICD-9-CM: 273.8  1/27/2022 Unknown        * (Principal) Sepsis ([facility] ICD-10-CM: A41.9  ICD-9-CM: 038.9, 995.91  1/26/2022 Yes        UTI (urinary tract infection) ICD-10-CM: N39.0  ICD-9-CM: 599.0  1/26/2022 Yes        Multifocal pneumonia ICD-10-CM: J18.9  ICD-9-CM: 093  1/26/2022 Yes        COPD (chronic obstructive pulmonary disease) ([facility] ICD-10-CM: J44.9  ICD-9-CM: 713  1/26/2022 Yes        HTN (hypertension) ICD-10-CM: I10  ICD-9-CM: 401.9  1/26/2022 Yes        Acute hypoxemic respiratory failure ([facility] ICD-10-CM: J96.01  ICD-9-CM: 518.81  1/26/2022 Yes        Choledocholithiasis ICD-10-CM: K80.50  ICD-9-CM: 574.50  1/26/2022 Yes        Difficulty swallowing ICD-10-CM: R13.10  ICD-9-CM: 787.20  1/26/2022 Yes        Constipation ICD-10-CM: K59.00  ICD-9-CM: 564.00  1/26/2022 Yes                  80year-old female with prior stroke, hypertension, memory impairment, COPD, and choledocholithiasis with chronic gallbladder fistula is admitted for acute hypoxemic respiratory failure with sepsis due to multifocal pneumonia and UTI.     Acute respiratory failure with hypoxia   Stable at 2 L    Due to pneumonia   Breathing tx as needed   Weaning off nc oxygen   covid 19 test negative     pna-hcap   Continue abx   Zosyn, vancomycin, and Levaquin for broad-spectrum antibiotic treatment, bcx negative, will d.c vanc   Incentive spirometry  Duonebs as needed     Hx of Choledocholithiasis with gallbladder fistula-she was deemed not a surgical candidate and she previously pulled out a cholecystostomy tube-repeated ct scan on 1/26:  LIVER/BILIARY: No suspicious liver lesion. No biliary dilation. Gallbladder  unremarkable.     Dysphagia   Aspiration precaution,speech evaluation    Hypokalemia  Potassium -replace and mg replaced      Memory impairment with history of strokepossible dementia     Hypertensionnot on home medications    Hypoalbuminemia 1.5 albumin   Continue albumin infusion    Hypernatremia   d5 0.45 low rate          Subjective: I want to eat    RN: Loose stool    Put imodium like K and mg loss from stool     Disposition :tbd,   Review of systems:  Not talking today   Vital signs/Intake and Output:  Visit Vitals  BP (!) 101/41 (BP 1 Location: Right lower arm, BP Patient Position: At rest)   Pulse (!) 58   Temp 98 °F (36.7 °C)   Resp 16   Ht 5' (1.524 m)   Wt 52.6 kg (116 lb)   SpO2 98%   BMI 22.65 kg/m²     Current Shift:  No intake/output data recorded. Last three shifts:  01/26 1901 - 01/28 0700  In: 1360 [P.O.:250; I.V.:1110]  Out: 50     Physical Exam:  General: WD, WN. Alert, cooperative, no acute distress    HEENT: NC, Atraumatic. PERRLA, anicteric sclerae. Lungs: CTA Bilaterally. No Wheezing/Rhonchi/Rales. Heart:  Regular  rhythm,  No murmur, No Rubs, No Gallops  Abdomen: Soft, Non distended, Non tender. +Bowel sounds, colectomy bag noted with liquid stool   Extremities: No c/c/e  Psych:   Not anxious or agitated. Neurologic:  No acute neurological deficit.   Answer some questions           Labs: Results:       Chemistry Recent Labs     01/28/22  0000 01/27/22  0327 01/26/22  1645   GLU 84 100* 111*   * 147* 142   K 2.9* 3.3* 4.7   * 115* 106   CO2 24 21 24   BUN 6* 9 13   CREA 0.50* 0.50* 1.04   CA 8.0* 7.5* 9.5   AGAP 9 11 12   BUCR 12 18 13 AP 50 86 121*   TP 5.2* 5.1* 6.6   ALB 3.2* 1.5* 2.2*   GLOB 2.0 3.6 4.4*   AGRAT 1.6 0.4* 0.5*      CBC w/Diff Recent Labs     01/28/22  0000 01/27/22  0327 01/26/22  1645   WBC 5.1 10.4 12.0   RBC 2.70* 3.65* 4.76   HGB 6.8* 9.2* 12.1   HCT 22.2* 29.7* 39.2    361 500*   GRANS  --   --  88*   LYMPH  --   --  6*   EOS  --   --  0      Cardiac Enzymes No results for input(s): CPK, CKND1, OVIDIO in the last 72 hours. No lab exists for component: CKRMB, TROIP   Coagulation Recent Labs     01/26/22  1645   PTP 13.5   INR 1.1       Lipid Panel No results found for: CHOL, CHOLPOCT, CHOLX, CHLST, CHOLV, 312904, HDL, HDLP, LDL, LDLC, DLDLP, 278672, VLDLC, VLDL, TGLX, TRIGL, TRIGP, TGLPOCT, CHHD, CHHDX   BNP No results for input(s): BNPP in the last 72 hours. Liver Enzymes Recent Labs     01/28/22  0000   TP 5.2*   ALB 3.2*   AP 50      Thyroid Studies No results found for: T4, T3U, TSH, TSHEXT, TSHEXT     Procedures/imaging: see electronic medical records for all procedures/Xrays and details which were not copied into this note but were reviewed prior to creation of Plan    CT CHEST ABD PELV W CONT    Result Date: 1/26/2022  EXAM:CT chest abdomen and pelvis with contrast CLINICAL INDICATION/HISTORY: Sepsis, hypotension COMPARISON: None TECHNIQUE: Multislice helical CT was performed through the chest, abdomen and pelvis with IV contrast. Coronal and sagittal reformations were generated. One or more dose reduction techniques were used on this CT: automated exposure control, adjustment of the mAs and/or kVp according to patient's size, and iterative reconstruction techniques. The specific techniques utilized on this CT exam have been documented in the patient's electronic medical record.   Digital imaging and communications and medicine (DICOM) format image data are available to nonaffiliated external healthcare facilities or entities on a secure, media free, reciprocally searchable basis with patient authorization for at least a 12 month period after this study. ========== FINDINGS: ------------------ CHEST: LUNGS: There is streaky and patchy opacities throughout the left lower lobe most pronounced basilar segments and mild patchy opacity basilar right lower lobe. Streaky parenchymal density middle lobe and lingula and mild additional peripheral chronic fibrotic changes with mild biapical scarring. No abnormal mass. PLEURA: Minimal left pleural effusion. AIRWAY: Mild bilateral lower lobe bronchiectasis. MEDIASTINUM: Normal heart and great vessels. No adenopathy. OTHER: Multiple chronic appearing left-sided rib fractures. No acute or aggressive osseous finding. ------------------ ABDOMEN/PELVIS: LIVER/BILIARY: No suspicious liver lesion. No biliary dilation. Gallbladder unremarkable. SPLEEN: Normal. PANCREAS: Normal. ADRENALS: Normal. KIDNEYS: Small nonobstructing bilateral renal calculi. No abnormal mass or obstruction. Benign-appearing left lower pole renal cyst. Ureters and bladder unremarkable. LYMPH NODES: No technically enlarged nodes. GI TRACT: Mildly distended stool-filled rectum with nonspecific air-fluid levels within colon without significant dilatation or wall thickening. No abnormal small bowel dilatation or wall thickening. No ascites or free air. VASCULATURE: Moderate aortoiliac atherosclerotic calcification. PELVIC ORGANS: Previous hysterectomy. OTHER: No aggressive appearing osseous lesion. Multilevel lower lumbar degenerative spondylosis. ==========     1. Bilateral lower lobe pneumonia left greater than right with minimal left pleural effusion with bilateral lower lobe bronchiectasis. Additional mild areas of atelectasis or scarring bilaterally. 2. Mildly distended stool-filled rectum likely related to constipation with no evidence of obstruction. 3. No other acute or significant abdominal or pelvic CT finding.      XR CHEST PORT    Result Date: 1/26/2022  EXAM: Portable frontal view of the chest. CLINICAL INDICATION/HISTORY: Hypotension COMPARISON: None. _______________ FINDINGS: Normal heart size with no mediastinal mass. Cardiac device overlying left side heart. There is some hazy abnormal parenchymal density left lung base with slight blunting left lateral costophrenic angle. No other lung consolidation or mass. Multiple chronic appearing left-sided rib fractures. _______________     1. FINDINGS suggestive of pneumonia and/or atelectasis left lower lung, possible small left pleural effusion.       Karon Seo MD

## 2022-01-28 NOTE — PROGRESS NOTES
DC Plan: return to [facility] when medically stable    Care manager rounded on patient who was laying on left side; appears sleeping with eyes closed. Anticipate patient will remain over weekend; page care manager on call if needed. Care Management Interventions  PCP Verified by CM:  Yes  Mode of Transport at Discharge: Self  Transition of Care Consult (CM Consult): Discharge [address]: Child(feliberto)  Confirm Follow Up Transport: Other (see comment)  The Plan for Transition of Care is Related to the Following Treatment Goals : sepsis  The Patient and/or Patient Representative was Provided with a Choice of Provider and Agrees with the Discharge Plan?: Yes  Name of the Patient Representative Who was Provided with a Choice of Provider and Agrees with the Discharge Plan: Wu Land, son  Freedom of Choice List was Provided with Basic Dialogue that Supports the Patient's Individualized Plan of Care/Goals, Treatment Preferences and Shares the Quality Data Associated with the Providers?: Yes  Discharge Location  Patient Expects to be Discharged to[de-identified] [facility]

## 2022-01-28 NOTE — PROGRESS NOTES
Physician Progress Note      Karen Martinez  CSN #:                  [id number]  :                       1935  ADMIT DATE:       2022 4:40 PM  [address] [place] DATE:  RESPONDING  PROVIDER #:        Gisselle DANIEL MD          QUERY TEXT:    Pt admitted with acute hypoxemic respiratory failure with sepsis due to multifocal pneumonia and UTI. [place] Pt noted to have Sepsis and acute organ dysfunction of Acute respiratory failure  . If possible, please document in progress notes and discharge summary the relationship, if any, between Sepsis and Acute respiratory failure . The medical record reflects the following:    Risk Factors: Chief complaint: sepsis, pna, uti, htn ,respiratory failure with hypoxia  ? Clinical Indicators: ED RN PN Pt brought in by EMS for Shortness of breath and hypotension ,H+P 4 L nasal cannula oxygen (not normally on oxygen) RR up to 33  in ED    Treatment: down to 2 L nc oxygen ,Duonebs ,IV Zosyn, vancomycin, and Levaquin for pna  Thank you  [email or web address]  Options provided:  -- Acute organ dysfunction of Acute respiratory failure  is associated with sepsis  -- Acute organ dysfunction of Acute respiratory failure  is unrelated to sepsis  -- Other - I will add my own diagnosis  -- Disagree - Not applicable / Not valid  -- Disagree - Clinically unable to determine / Unknown  -- Refer to Clinical Documentation Reviewer    PROVIDER RESPONSE TEXT:    This patient has acute organ dysfunction of Acute respiratory failure associated with sepsis. Query created by:  Silvia Luna on 2022 7:05 AM      Electronically signed by:  Gisselle Katz MD 2022 4:29 PM

## 2022-01-28 NOTE — PROGRESS NOTES
0448 critical result: potassium 2.9 Dr Donita Ward notified and orders received. Bedside and Verbal shift change report given to Kisha Bullock RN (oncoming nurse) by Kelly Morgan RN (offgoing nurse).  Report included the following information SBAR, Kardex, Intake/Output, MAR, Recent Results, and Cardiac Rhythm: SR.

## 2022-01-29 LAB
ALBUMIN SERPL-MCNC: 3.8 G/DL (ref 3.4–5)
ALBUMIN/GLOB SERPL: 1.9 {RATIO} (ref 0.8–1.7)
ALP SERPL-CCNC: 40 U/L (ref 45–117)
ALT SERPL-CCNC: 32 U/L (ref 13–56)
ANION GAP SERPL CALC-SCNC: 6 MMOL/L (ref 3–18)
ANION GAP SERPL CALC-SCNC: 7 MMOL/L (ref 3–18)
AST SERPL-CCNC: 29 U/L (ref 10–38)
ATRIAL RATE: 139 BPM
BILIRUB SERPL-MCNC: 0.9 MG/DL (ref 0.2–1)
BUN SERPL-MCNC: 2 MG/DL (ref 7–18)
BUN SERPL-MCNC: 2 MG/DL (ref 7–18)
BUN/CREAT SERPL: 5 (ref 12–20)
BUN/CREAT SERPL: 5 (ref 12–20)
CALCIUM SERPL-MCNC: 8.4 MG/DL (ref 8.5–10.1)
CALCIUM SERPL-MCNC: 8.5 MG/DL (ref 8.5–10.1)
CALCULATED P AXIS, ECG09: 49 DEGREES
CALCULATED R AXIS, ECG10: -13 DEGREES
CALCULATED T AXIS, ECG11: 49 DEGREES
CHLORIDE SERPL-SCNC: 106 MMOL/L (ref 100–111)
CHLORIDE SERPL-SCNC: 107 MMOL/L (ref 100–111)
CO2 SERPL-SCNC: 31 MMOL/L (ref 21–32)
CO2 SERPL-SCNC: 31 MMOL/L (ref 21–32)
CREAT SERPL-MCNC: 0.4 MG/DL (ref 0.6–1.3)
CREAT SERPL-MCNC: 0.41 MG/DL (ref 0.6–1.3)
DIAGNOSIS, 93000: NORMAL
ERYTHROCYTE [DISTWIDTH] IN BLOOD BY AUTOMATED COUNT: 25 % (ref 11.6–14.5)
GLOBULIN SER CALC-MCNC: 2 G/DL (ref 2–4)
GLUCOSE SERPL-MCNC: 85 MG/DL (ref 74–99)
GLUCOSE SERPL-MCNC: 94 MG/DL (ref 74–99)
HCT VFR BLD AUTO: 20.4 % (ref 35–45)
HGB BLD-MCNC: 6.6 G/DL (ref 12–16)
MAGNESIUM SERPL-MCNC: 1.9 MG/DL (ref 1.6–2.6)
MCH RBC QN AUTO: 25.8 PG (ref 24–34)
MCHC RBC AUTO-ENTMCNC: 32.4 G/DL (ref 31–37)
MCV RBC AUTO: 79.7 FL (ref 78–100)
NRBC # BLD: 0 K/UL (ref 0–0.01)
NRBC BLD-RTO: 0 PER 100 WBC
P-R INTERVAL, ECG05: 124 MS
PLATELET # BLD AUTO: 234 K/UL (ref 135–420)
PMV BLD AUTO: 10.3 FL (ref 9.2–11.8)
POTASSIUM SERPL-SCNC: 2.1 MMOL/L (ref 3.5–5.5)
POTASSIUM SERPL-SCNC: 2.3 MMOL/L (ref 3.5–5.5)
PROT SERPL-MCNC: 5.8 G/DL (ref 6.4–8.2)
Q-T INTERVAL, ECG07: 296 MS
QRS DURATION, ECG06: 60 MS
QTC CALCULATION (BEZET), ECG08: 450 MS
RBC # BLD AUTO: 2.56 M/UL (ref 4.2–5.3)
SODIUM SERPL-SCNC: 144 MMOL/L (ref 136–145)
SODIUM SERPL-SCNC: 144 MMOL/L (ref 136–145)
VENTRICULAR RATE, ECG03: 139 BPM
WBC # BLD AUTO: 4.4 K/UL (ref 4.6–13.2)

## 2022-01-29 PROCEDURE — 80053 COMPREHEN METABOLIC PANEL: CPT

## 2022-01-29 PROCEDURE — 74011000250 HC RX REV CODE- 250: Performed by: FAMILY MEDICINE

## 2022-01-29 PROCEDURE — 74011250636 HC RX REV CODE- 250/636: Performed by: FAMILY MEDICINE

## 2022-01-29 PROCEDURE — 85027 COMPLETE CBC AUTOMATED: CPT

## 2022-01-29 PROCEDURE — 74011250636 HC RX REV CODE- 250/636: Performed by: HOSPITALIST

## 2022-01-29 PROCEDURE — 74011250636 HC RX REV CODE- 250/636: Performed by: EMERGENCY MEDICINE

## 2022-01-29 PROCEDURE — 74011250637 HC RX REV CODE- 250/637: Performed by: INTERNAL MEDICINE

## 2022-01-29 PROCEDURE — 36415 COLL VENOUS BLD VENIPUNCTURE: CPT

## 2022-01-29 PROCEDURE — 74011000258 HC RX REV CODE- 258: Performed by: EMERGENCY MEDICINE

## 2022-01-29 PROCEDURE — 74011250637 HC RX REV CODE- 250/637: Performed by: HOSPITALIST

## 2022-01-29 PROCEDURE — 77010033678 HC OXYGEN DAILY

## 2022-01-29 PROCEDURE — 65660000000 HC RM CCU STEPDOWN

## 2022-01-29 PROCEDURE — 83735 ASSAY OF MAGNESIUM: CPT

## 2022-01-29 PROCEDURE — P9047 ALBUMIN (HUMAN), 25%, 50ML: HCPCS | Performed by: HOSPITALIST

## 2022-01-29 RX ORDER — POTASSIUM CHLORIDE 20 MEQ/1
40 TABLET, EXTENDED RELEASE ORAL EVERY 4 HOURS
Status: COMPLETED | OUTPATIENT
Start: 2022-01-29 | End: 2022-01-29

## 2022-01-29 RX ORDER — POTASSIUM CHLORIDE 7.45 MG/ML
10 INJECTION INTRAVENOUS
Status: COMPLETED | OUTPATIENT
Start: 2022-01-29 | End: 2022-01-30

## 2022-01-29 RX ADMIN — POTASSIUM CHLORIDE 40 MEQ: 20 TABLET, EXTENDED RELEASE ORAL at 06:33

## 2022-01-29 RX ADMIN — SODIUM CHLORIDE, PRESERVATIVE FREE 10 ML: 5 INJECTION INTRAVENOUS at 05:44

## 2022-01-29 RX ADMIN — POTASSIUM CHLORIDE 10 MEQ: 7.46 INJECTION, SOLUTION INTRAVENOUS at 16:00

## 2022-01-29 RX ADMIN — SODIUM CHLORIDE, PRESERVATIVE FREE 10 ML: 5 INJECTION INTRAVENOUS at 05:46

## 2022-01-29 RX ADMIN — PIPERACILLIN AND TAZOBACTAM 4.5 G: 4; .5 INJECTION, POWDER, LYOPHILIZED, FOR SOLUTION INTRAVENOUS; PARENTERAL at 17:04

## 2022-01-29 RX ADMIN — POTASSIUM CHLORIDE 10 MEQ: 7.46 INJECTION, SOLUTION INTRAVENOUS at 15:00

## 2022-01-29 RX ADMIN — FAMOTIDINE 20 MG: 10 INJECTION, SOLUTION INTRAVENOUS at 08:41

## 2022-01-29 RX ADMIN — SODIUM CHLORIDE, PRESERVATIVE FREE 5 ML: 5 INJECTION INTRAVENOUS at 14:00

## 2022-01-29 RX ADMIN — PIPERACILLIN AND TAZOBACTAM 4.5 G: 4; .5 INJECTION, POWDER, LYOPHILIZED, FOR SOLUTION INTRAVENOUS; PARENTERAL at 05:43

## 2022-01-29 RX ADMIN — ENOXAPARIN SODIUM 40 MG: 100 INJECTION SUBCUTANEOUS at 08:41

## 2022-01-29 RX ADMIN — ALBUMIN (HUMAN) 25 G: 0.25 INJECTION, SOLUTION INTRAVENOUS at 23:00

## 2022-01-29 RX ADMIN — POTASSIUM CHLORIDE 10 MEQ: 7.46 INJECTION, SOLUTION INTRAVENOUS at 17:00

## 2022-01-29 RX ADMIN — ALBUMIN (HUMAN) 25 G: 0.25 INJECTION, SOLUTION INTRAVENOUS at 12:00

## 2022-01-29 RX ADMIN — ALBUMIN (HUMAN) 25 G: 0.25 INJECTION, SOLUTION INTRAVENOUS at 00:18

## 2022-01-29 RX ADMIN — POTASSIUM CHLORIDE 40 MEQ: 20 TABLET, EXTENDED RELEASE ORAL at 11:00

## 2022-01-29 RX ADMIN — Medication 500 MG: at 08:40

## 2022-01-29 RX ADMIN — SODIUM CHLORIDE, PRESERVATIVE FREE 10 ML: 5 INJECTION INTRAVENOUS at 23:00

## 2022-01-29 RX ADMIN — ALBUMIN (HUMAN) 25 G: 0.25 INJECTION, SOLUTION INTRAVENOUS at 05:44

## 2022-01-29 RX ADMIN — POTASSIUM CHLORIDE 10 MEQ: 7.46 INJECTION, SOLUTION INTRAVENOUS at 18:00

## 2022-01-29 RX ADMIN — PIPERACILLIN AND TAZOBACTAM 4.5 G: 4; .5 INJECTION, POWDER, LYOPHILIZED, FOR SOLUTION INTRAVENOUS; PARENTERAL at 23:00

## 2022-01-29 RX ADMIN — ALBUMIN (HUMAN) 25 G: 0.25 INJECTION, SOLUTION INTRAVENOUS at 18:00

## 2022-01-29 RX ADMIN — PIPERACILLIN AND TAZOBACTAM 4.5 G: 4; .5 INJECTION, POWDER, LYOPHILIZED, FOR SOLUTION INTRAVENOUS; PARENTERAL at 00:18

## 2022-01-29 RX ADMIN — PIPERACILLIN AND TAZOBACTAM 4.5 G: 4; .5 INJECTION, POWDER, LYOPHILIZED, FOR SOLUTION INTRAVENOUS; PARENTERAL at 11:04

## 2022-01-29 NOTE — PROGRESS NOTES
Hospitalist Progress Note-critical care note     Patient: Michelle Camara MRN: [id number]  CSN: [id number]    YOB: 1935  Age: 80 y.o. Sex: female    DOA: 1/26/2022 LOS:  LOS: 3 days            Chief complaint: sepsis, pna, uti, htn ,respiratory failure with hypoxia      Assessment/Plan     Hospital Problems  Date Reviewed: 1/27/2022          Codes Class Noted POA    Memory loss or impairment ICD-10-CM: R41.3  ICD-9-CM: 780.93  1/27/2022 Yes        History of stroke ICD-10-CM: Z86.73  ICD-9-CM: V12.54  1/27/2022 Yes        Hypokalemia ICD-10-CM: E87.6  ICD-9-CM: 276.8  1/27/2022 Unknown        Hypoalbuminemia ICD-10-CM: E88.09  ICD-9-CM: 273.8  1/27/2022 Unknown        * (Principal) Sepsis ([facility] ICD-10-CM: A41.9  ICD-9-CM: 038.9, 995.91  1/26/2022 Yes        UTI (urinary tract infection) ICD-10-CM: N39.0  ICD-9-CM: 599.0  1/26/2022 Yes        Multifocal pneumonia ICD-10-CM: J18.9  ICD-9-CM: 575  1/26/2022 Yes        COPD (chronic obstructive pulmonary disease) ([facility] ICD-10-CM: J44.9  ICD-9-CM: 829  1/26/2022 Yes        HTN (hypertension) ICD-10-CM: I10  ICD-9-CM: 401.9  1/26/2022 Yes        Acute hypoxemic respiratory failure ([facility] ICD-10-CM: J96.01  ICD-9-CM: 518.81  1/26/2022 Yes        Choledocholithiasis ICD-10-CM: K80.50  ICD-9-CM: 574.50  1/26/2022 Yes        Difficulty swallowing ICD-10-CM: R13.10  ICD-9-CM: 787.20  1/26/2022 Yes        Constipation ICD-10-CM: K59.00  ICD-9-CM: 564.00  1/26/2022 Yes                  80year-old female with prior stroke, hypertension, memory impairment, COPD, and choledocholithiasis with chronic gallbladder fistula is admitted for acute hypoxemic respiratory failure with sepsis due to multifocal pneumonia and UTI. Anemia -  Hgb 6.6  No change in overall condition or s/sxs attributable to anemia.   Will monitor for now     Acute respiratory failure with hypoxia   Stable at 2 L    Due to pneumonia   Breathing tx as needed   Weaning off nc oxygen   covid 19 test negative     pna-hcap   Continue abx   Zosyn, vancomycin, and Levaquin for broad-spectrum antibiotic treatment, bcx negative, will d.c vanc   Incentive spirometry  Duonebs as needed     Hx of Choledocholithiasis with gallbladder fistula-she was deemed not a surgical candidate and she previously pulled out a cholecystostomy tube-repeated ct scan on 1/26:  LIVER/BILIARY: No suspicious liver lesion. No biliary dilation. Gallbladder  unremarkable.     Dysphagia   Aspiration precaution,speech evaluation    Hypokalemia  Potassium still very low 2.3  Will replete and recheck      Memory impairment with history of strokepossible dementia     Hypertensionnot on home medications    Hypoalbuminemia -resolved    Hypernatremia -resolved     Subjective: Pt only answering by shaking head yes and no. Not speaking words. Disposition :tbd,   Review of systems:  Not talking today   Vital signs/Intake and Output:  Visit Vitals  BP (!) 122/57   Pulse 79   Temp 98 °F (36.7 °C)   Resp 16   Ht 5' (1.524 m)   Wt 52.6 kg (116 lb)   SpO2 100%   BMI 22.65 kg/m²     Current Shift:  No intake/output data recorded. Last three shifts:  01/27 1901 - 01/29 0700  In: 1160 [P.O.:250; I.V.:910]  Out: 950 [Urine:900]    Physical Exam:  General: WD, WN. Alert, cooperative, no acute distress    HEENT: NC, Atraumatic. PERRLA, anicteric sclerae. Lungs: CTA Bilaterally. No Wheezing/Rhonchi/Rales. Heart:  Regular  rhythm,  No murmur, No Rubs, No Gallops  Abdomen: Soft, Non distended, Non tender. +Bowel sounds, colectomy bag noted with liquid stool   Extremities: No c/c/e  Psych:   Not anxious or agitated. Neurologic:  No acute neurological deficit.   Answer some questions           Labs: Results:       Chemistry Recent Labs     01/29/22  1032 01/29/22  0245 01/28/22  0000 01/27/22  0327 01/27/22  0327   GLU 94 85 84   < > 100*    144 146*   < > 147*   K 2.3* 2.1* 2.9*   < > 3.3*    107 113*   < > 115*   CO2 31 31 24   < > 21   BUN 2* 2* 6*   < > 9   CREA 0.41* 0.40* 0.50*   < > 0.50*   CA 8.5 8.4* 8.0*   < > 7.5*   AGAP 7 6 9   < > 11   BUCR 5* 5* 12   < > 18   AP  --  40* 50  --  86   TP  --  5.8* 5.2*  --  5.1*   ALB  --  3.8 3.2*  --  1.5*   GLOB  --  2.0 2.0  --  3.6   AGRAT  --  1.9* 1.6  --  0.4*    < > = values in this interval not displayed. CBC w/Diff Recent Labs     01/29/22  0245 01/28/22  0000 01/27/22  0327 01/26/22  1645 01/26/22  1645   WBC 4.4* 5.1 10.4   < > 12.0   RBC 2.56* 2.70* 3.65*   < > 4.76   HGB 6.6* 6.8* 9.2*   < > 12.1   HCT 20.4* 22.2* 29.7*   < > 39.2    253 361   < > 500*   GRANS  --   --   --   --  88*   LYMPH  --   --   --   --  6*   EOS  --   --   --   --  0    < > = values in this interval not displayed. Cardiac Enzymes No results for input(s): CPK, CKND1, OVIDIO in the last 72 hours. No lab exists for component: CKRMB, TROIP   Coagulation Recent Labs     01/26/22 1645   PTP 13.5   INR 1.1       Lipid Panel No results found for: CHOL, CHOLPOCT, CHOLX, CHLST, CHOLV, 881195, HDL, HDLP, LDL, LDLC, DLDLP, 158551, VLDLC, VLDL, TGLX, TRIGL, TRIGP, TGLPOCT, CHHD, CHHDX   BNP No results for input(s): BNPP in the last 72 hours. Liver Enzymes Recent Labs     01/29/22  0245   TP 5.8*   ALB 3.8   AP 40*      Thyroid Studies No results found for: T4, T3U, TSH, TSHEXT, TSHEXT     Procedures/imaging: see electronic medical records for all procedures/Xrays and details which were not copied into this note but were reviewed prior to creation of Plan    CT CHEST ABD PELV W CONT    Result Date: 1/26/2022  EXAM:CT chest abdomen and pelvis with contrast CLINICAL INDICATION/HISTORY: Sepsis, hypotension COMPARISON: None TECHNIQUE: Multislice helical CT was performed through the chest, abdomen and pelvis with IV contrast. Coronal and sagittal reformations were generated.  One or more dose reduction techniques were used on this CT: automated exposure control, adjustment of the mAs and/or kVp according to patient's size, and iterative reconstruction techniques. The specific techniques utilized on this CT exam have been documented in the patient's electronic medical record. Digital imaging and communications and medicine (DICOM) format image data are available to nonaffiliated external healthcare facilities or entities on a secure, media free, reciprocally searchable basis with patient authorization for at least a 12 month period after this study. ========== FINDINGS: ------------------ CHEST: LUNGS: There is streaky and patchy opacities throughout the left lower lobe most pronounced basilar segments and mild patchy opacity basilar right lower lobe. Streaky parenchymal density middle lobe and lingula and mild additional peripheral chronic fibrotic changes with mild biapical scarring. No abnormal mass. PLEURA: Minimal left pleural effusion. AIRWAY: Mild bilateral lower lobe bronchiectasis. MEDIASTINUM: Normal heart and great vessels. No adenopathy. OTHER: Multiple chronic appearing left-sided rib fractures. No acute or aggressive osseous finding. ------------------ ABDOMEN/PELVIS: LIVER/BILIARY: No suspicious liver lesion. No biliary dilation. Gallbladder unremarkable. SPLEEN: Normal. PANCREAS: Normal. ADRENALS: Normal. KIDNEYS: Small nonobstructing bilateral renal calculi. No abnormal mass or obstruction. Benign-appearing left lower pole renal cyst. Ureters and bladder unremarkable. LYMPH NODES: No technically enlarged nodes. GI TRACT: Mildly distended stool-filled rectum with nonspecific air-fluid levels within colon without significant dilatation or wall thickening. No abnormal small bowel dilatation or wall thickening. No ascites or free air. VASCULATURE: Moderate aortoiliac atherosclerotic calcification. PELVIC ORGANS: Previous hysterectomy. OTHER: No aggressive appearing osseous lesion. Multilevel lower lumbar degenerative spondylosis. ==========     1.  Bilateral lower lobe pneumonia left greater than right with minimal left pleural effusion with bilateral lower lobe bronchiectasis. Additional mild areas of atelectasis or scarring bilaterally. 2. Mildly distended stool-filled rectum likely related to constipation with no evidence of obstruction. 3. No other acute or significant abdominal or pelvic CT finding. XR CHEST PORT    Result Date: 1/26/2022  EXAM: Portable frontal view of the chest. CLINICAL INDICATION/HISTORY: Hypotension COMPARISON: None. _______________ FINDINGS: Normal heart size with no mediastinal mass. Cardiac device overlying left side heart. There is some hazy abnormal parenchymal density left lung base with slight blunting left lateral costophrenic angle. No other lung consolidation or mass. Multiple chronic appearing left-sided rib fractures. _______________     1. FINDINGS suggestive of pneumonia and/or atelectasis left lower lung, possible small left pleural effusion.

## 2022-01-29 NOTE — PROGRESS NOTES
0700 Assumed care of pt from [facility]. Assessed pt. Pt denies any pain or discomfort. Pt in bed sleeping.

## 2022-01-30 PROBLEM — D64.9 ANEMIA: Status: ACTIVE | Noted: 2022-01-30

## 2022-01-30 LAB
ALBUMIN SERPL-MCNC: 4.4 G/DL (ref 3.4–5)
ALBUMIN/GLOB SERPL: 2.4 {RATIO} (ref 0.8–1.7)
ALP SERPL-CCNC: 42 U/L (ref 45–117)
ALT SERPL-CCNC: 28 U/L (ref 13–56)
ANION GAP SERPL CALC-SCNC: 4 MMOL/L (ref 3–18)
ANION GAP SERPL CALC-SCNC: 7 MMOL/L (ref 3–18)
AST SERPL-CCNC: 26 U/L (ref 10–38)
BILIRUB SERPL-MCNC: 1.5 MG/DL (ref 0.2–1)
BUN SERPL-MCNC: 2 MG/DL (ref 7–18)
BUN SERPL-MCNC: 4 MG/DL (ref 7–18)
BUN/CREAT SERPL: 5 (ref 12–20)
BUN/CREAT SERPL: 9 (ref 12–20)
CALCIUM SERPL-MCNC: 8.4 MG/DL (ref 8.5–10.1)
CALCIUM SERPL-MCNC: 8.7 MG/DL (ref 8.5–10.1)
CHLORIDE SERPL-SCNC: 109 MMOL/L (ref 100–111)
CHLORIDE SERPL-SCNC: 112 MMOL/L (ref 100–111)
CO2 SERPL-SCNC: 28 MMOL/L (ref 21–32)
CO2 SERPL-SCNC: 29 MMOL/L (ref 21–32)
CREAT SERPL-MCNC: 0.43 MG/DL (ref 0.6–1.3)
CREAT SERPL-MCNC: 0.44 MG/DL (ref 0.6–1.3)
ERYTHROCYTE [DISTWIDTH] IN BLOOD BY AUTOMATED COUNT: 25.5 % (ref 11.6–14.5)
GLOBULIN SER CALC-MCNC: 1.8 G/DL (ref 2–4)
GLUCOSE SERPL-MCNC: 87 MG/DL (ref 74–99)
GLUCOSE SERPL-MCNC: 99 MG/DL (ref 74–99)
HCT VFR BLD AUTO: 24.8 % (ref 35–45)
HGB BLD-MCNC: 8.1 G/DL (ref 12–16)
MAGNESIUM SERPL-MCNC: 1.7 MG/DL (ref 1.6–2.6)
MCH RBC QN AUTO: 26.5 PG (ref 24–34)
MCHC RBC AUTO-ENTMCNC: 32.7 G/DL (ref 31–37)
MCV RBC AUTO: 81 FL (ref 78–100)
NRBC # BLD: 0 K/UL (ref 0–0.01)
NRBC BLD-RTO: 0 PER 100 WBC
PLATELET # BLD AUTO: 280 K/UL (ref 135–420)
PMV BLD AUTO: 10.2 FL (ref 9.2–11.8)
POTASSIUM SERPL-SCNC: 2.8 MMOL/L (ref 3.5–5.5)
POTASSIUM SERPL-SCNC: 3 MMOL/L (ref 3.5–5.5)
PROT SERPL-MCNC: 6.2 G/DL (ref 6.4–8.2)
RBC # BLD AUTO: 3.06 M/UL (ref 4.2–5.3)
SODIUM SERPL-SCNC: 144 MMOL/L (ref 136–145)
SODIUM SERPL-SCNC: 145 MMOL/L (ref 136–145)
WBC # BLD AUTO: 5.5 K/UL (ref 4.6–13.2)

## 2022-01-30 PROCEDURE — 97530 THERAPEUTIC ACTIVITIES: CPT

## 2022-01-30 PROCEDURE — 74011250636 HC RX REV CODE- 250/636: Performed by: HOSPITALIST

## 2022-01-30 PROCEDURE — 74011250637 HC RX REV CODE- 250/637: Performed by: HOSPITALIST

## 2022-01-30 PROCEDURE — 74011250636 HC RX REV CODE- 250/636: Performed by: FAMILY MEDICINE

## 2022-01-30 PROCEDURE — 83735 ASSAY OF MAGNESIUM: CPT

## 2022-01-30 PROCEDURE — 65660000000 HC RM CCU STEPDOWN

## 2022-01-30 PROCEDURE — P9047 ALBUMIN (HUMAN), 25%, 50ML: HCPCS | Performed by: HOSPITALIST

## 2022-01-30 PROCEDURE — 80053 COMPREHEN METABOLIC PANEL: CPT

## 2022-01-30 PROCEDURE — 74011250636 HC RX REV CODE- 250/636: Performed by: EMERGENCY MEDICINE

## 2022-01-30 PROCEDURE — 74011000250 HC RX REV CODE- 250: Performed by: FAMILY MEDICINE

## 2022-01-30 PROCEDURE — 74011250637 HC RX REV CODE- 250/637: Performed by: INTERNAL MEDICINE

## 2022-01-30 PROCEDURE — 85027 COMPLETE CBC AUTOMATED: CPT

## 2022-01-30 PROCEDURE — 74011000258 HC RX REV CODE- 258: Performed by: EMERGENCY MEDICINE

## 2022-01-30 PROCEDURE — 36415 COLL VENOUS BLD VENIPUNCTURE: CPT

## 2022-01-30 PROCEDURE — 74011250636 HC RX REV CODE- 250/636: Performed by: INTERNAL MEDICINE

## 2022-01-30 RX ORDER — POTASSIUM CHLORIDE 20 MEQ/1
40 TABLET, EXTENDED RELEASE ORAL EVERY 4 HOURS
Status: COMPLETED | OUTPATIENT
Start: 2022-01-30 | End: 2022-01-30

## 2022-01-30 RX ORDER — MAGNESIUM SULFATE HEPTAHYDRATE 40 MG/ML
2 INJECTION, SOLUTION INTRAVENOUS ONCE
Status: COMPLETED | OUTPATIENT
Start: 2022-01-30 | End: 2022-01-30

## 2022-01-30 RX ORDER — POTASSIUM CHLORIDE 7.45 MG/ML
10 INJECTION INTRAVENOUS
Status: COMPLETED | OUTPATIENT
Start: 2022-01-30 | End: 2022-01-31

## 2022-01-30 RX ADMIN — POTASSIUM CHLORIDE 10 MEQ: 7.46 INJECTION, SOLUTION INTRAVENOUS at 22:46

## 2022-01-30 RX ADMIN — POTASSIUM CHLORIDE 40 MEQ: 20 TABLET, EXTENDED RELEASE ORAL at 05:52

## 2022-01-30 RX ADMIN — SODIUM CHLORIDE, PRESERVATIVE FREE 10 ML: 5 INJECTION INTRAVENOUS at 05:52

## 2022-01-30 RX ADMIN — POTASSIUM CHLORIDE 40 MEQ: 20 TABLET, EXTENDED RELEASE ORAL at 10:19

## 2022-01-30 RX ADMIN — FAMOTIDINE 20 MG: 10 INJECTION, SOLUTION INTRAVENOUS at 10:19

## 2022-01-30 RX ADMIN — PIPERACILLIN AND TAZOBACTAM 4.5 G: 4; .5 INJECTION, POWDER, LYOPHILIZED, FOR SOLUTION INTRAVENOUS; PARENTERAL at 16:50

## 2022-01-30 RX ADMIN — PIPERACILLIN AND TAZOBACTAM 4.5 G: 4; .5 INJECTION, POWDER, LYOPHILIZED, FOR SOLUTION INTRAVENOUS; PARENTERAL at 13:25

## 2022-01-30 RX ADMIN — MAGNESIUM SULFATE HEPTAHYDRATE 2 G: 40 INJECTION, SOLUTION INTRAVENOUS at 05:51

## 2022-01-30 RX ADMIN — PIPERACILLIN AND TAZOBACTAM 4.5 G: 4; .5 INJECTION, POWDER, LYOPHILIZED, FOR SOLUTION INTRAVENOUS; PARENTERAL at 05:51

## 2022-01-30 RX ADMIN — POTASSIUM CHLORIDE 10 MEQ: 7.46 INJECTION, SOLUTION INTRAVENOUS at 23:30

## 2022-01-30 RX ADMIN — SODIUM CHLORIDE, PRESERVATIVE FREE 10 ML: 5 INJECTION INTRAVENOUS at 21:39

## 2022-01-30 RX ADMIN — SODIUM CHLORIDE, PRESERVATIVE FREE 20 ML: 5 INJECTION INTRAVENOUS at 13:25

## 2022-01-30 RX ADMIN — Medication 500 MG: at 10:19

## 2022-01-30 RX ADMIN — POTASSIUM CHLORIDE 10 MEQ: 7.46 INJECTION, SOLUTION INTRAVENOUS at 21:38

## 2022-01-30 RX ADMIN — ENOXAPARIN SODIUM 40 MG: 100 INJECTION SUBCUTANEOUS at 10:19

## 2022-01-30 RX ADMIN — ALBUMIN (HUMAN) 25 G: 0.25 INJECTION, SOLUTION INTRAVENOUS at 05:51

## 2022-01-30 NOTE — ROUTINE PROCESS
Bedside and Verbal shift change report given to Ray Pavon (oncoming nurse) by Serafin Alicea RN (offgoing nurse). Report included the following information SBAR, Kardex, MAR and Recent Results.

## 2022-01-30 NOTE — PROGRESS NOTES
2000 Patient has a critical K at 2.8 and Mg at 1.7. Dr Mine Noguera notified. K 40 mEq orders and Mg 2 g IV ordered and administered. Patient takes time when taking pills but had no issues swallowing. Patient seems to be increasing alert from prior assessment but still pleasantly confused. Patient VSS, morning meds were given and patient was cleaned up and set up for meal. Patient refuses to eat but will continue to offer small bites at a time.

## 2022-01-30 NOTE — PROGRESS NOTES
Problem: Mobility Impaired (Adult and Pediatric)  Goal: *Acute Goals and Plan of Care (Insert Text)  Description: Physical Therapy Goals  Initiated 1/28/2022 and to be accomplished within 7 day(s)  1. Patient will move from supine to sit and sit to supine  in bed with moderate assistance . 2.  Patient will transfer from bed to chair and chair to bed with moderate assistance  using the least restrictive device. 3.  Patient will perform sit to stand with moderate assistance . 4. Patient will ambulate with moderate assistance  for 3 feet with the least restrictive device. PLOF: pt resides at the [facility], unclear if patient was ambulatory     1/30/2022 1117 by Monica Martinez PTA  Outcome: Not Progressing Towards Goal   physical Therapy TREATMENT    Patient: Lucas Strauss (73 y.o. female)  Date: 1/30/2022  Diagnosis: Sepsis ([facility] [A41.9] Sepsis ([facility]       Precautions: Fall,Skin   Chart, physical therapy assessment, plan of care and goals were reviewed. ASSESSMENT:  Pt found resting in bed, initially refusing session. Able to be convinced to attempt EOB sitting and mod assist was provided to EOB. Posterior lean throughout 6 min of sitting. Attempted postural correction, but pt has increase of extensor tone. At this point, PT demands to be returned to bed. Returned and positioned for comfort. Placement is suggested for this pt. Progression toward goals:  []      Improving appropriately and progressing toward goals  [x]      Improving slowly and progressing toward goals  []      Not making progress toward goals and plan of care will be adjusted     PLAN:  Patient continues to benefit from skilled intervention to address the above impairments. Continue treatment per established plan of care.   Discharge Recommendations:  Urbano Usman  Further Equipment Recommendations for Discharge:  hospital bed     SUBJECTIVE:   Patient stated I don't want to do therapy today.    OBJECTIVE DATA SUMMARY:   Critical Behavior:  Neurologic State: Alert,Confused  Orientation Level: Oriented to person  Cognition: Memory loss  Safety/Judgement: Lack of insight into deficits  Functional Mobility Training:  Bed Mobility:  Rolling: Moderate assistance  Supine to Sit: Moderate assistance  Sit to Supine: Minimum assistance  Scooting: Moderate assistance  Transfers:  Sit to Stand:  (Refused)  Balance:  Sitting: Impaired  Sitting - Static: Poor (constant support)  Sitting - Dynamic: Poor (constant support)  Therapeutic Exercises:     Pain:  Pain Scale 1: Numeric (0 - 10)  Pain Intensity 1: 0  Pain Out: 0  Activity Tolerance:   Poor  Please refer to the flowsheet for vital signs taken during this treatment.   After treatment:   [] Patient left in no apparent distress sitting up in chair  [x] Patient left in no apparent distress in bed  [x] Call bell left within reach  [x] Nursing notified  [] Caregiver present  [x] Bed alarm activated      Tor Iverson PTA   Time Calculation: 11 mins

## 2022-01-30 NOTE — PROGRESS NOTES
Hospitalist Progress Note-critical care note     Patient: Zheng Nuñez MRN: [id number]  CSN: [id number]    YOB: 1935  Age: 80 y.o. Sex: female    DOA: 1/26/2022 LOS:  LOS: 4 days            Chief complaint: sepsis, pna, uti, htn ,respiratory failure with hypoxia      Assessment/Plan     Hospital Problems  Date Reviewed: 1/27/2022          Codes Class Noted POA    Anemia ICD-10-CM: D64.9  ICD-9-CM: 285.9  1/30/2022 Unknown        Memory loss or impairment ICD-10-CM: R41.3  ICD-9-CM: 780.93  1/27/2022 Yes        History of stroke ICD-10-CM: Z86.73  ICD-9-CM: V12.54  1/27/2022 Yes        Hypokalemia ICD-10-CM: E87.6  ICD-9-CM: 276.8  1/27/2022 Unknown        Hypoalbuminemia ICD-10-CM: E88.09  ICD-9-CM: 273.8  1/27/2022 Unknown        * (Principal) Sepsis ([facility].) ICD-10-CM: A41.9  ICD-9-CM: 038.9, 995.91  1/26/2022 Yes        UTI (urinary tract infection) ICD-10-CM: N39.0  ICD-9-CM: 599.0  1/26/2022 Yes        Multifocal pneumonia ICD-10-CM: J18.9  ICD-9-CM: 173  1/26/2022 Yes        COPD (chronic obstructive pulmonary disease) ([facility] ICD-10-CM: J44.9  ICD-9-CM: 964  1/26/2022 Yes        HTN (hypertension) ICD-10-CM: I10  ICD-9-CM: 401.9  1/26/2022 Yes        Acute hypoxemic respiratory failure ([facility] ICD-10-CM: J96.01  ICD-9-CM: 518.81  1/26/2022 Yes        Choledocholithiasis ICD-10-CM: K80.50  ICD-9-CM: 574.50  1/26/2022 Yes        Difficulty swallowing ICD-10-CM: R13.10  ICD-9-CM: 787.20  1/26/2022 Yes        Constipation ICD-10-CM: K59.00  ICD-9-CM: 564.00  1/26/2022 Yes                  80year-old female with prior stroke, hypertension, memory impairment, COPD, and choledocholithiasis with chronic gallbladder fistula is admitted for acute hypoxemic respiratory failure with sepsis due to multifocal pneumonia and UTI. Anemia -  Hgb 8.1 today   No change in overall condition or s/sxs attributable to anemia.   Will monitor for now     Acute respiratory failure with hypoxia   Due to pneumonia Breathing tx as needed   Weaned off 02, on RA now   covid 19 test negative     pna-hcap   Continue abx   Zosyn and Levaquin for broad-spectrum antibiotic treatment, bcx negative  Incentive spirometry  Duonebs as needed     Hx of Choledocholithiasis with gallbladder fistula-she was deemed not a surgical candidate and she previously pulled out a cholecystostomy tube-repeated ct scan on 1/26:  LIVER/BILIARY: No suspicious liver lesion. No biliary dilation. Gallbladder  unremarkable.     Dysphagia   Aspiration precaution,speech evaluation    Hypokalemia  Potassium still very low 2.8  Will recheck and continue to replete.      Memory impairment with history of strokepossible dementia     Hypertensionnot on home medications    Hypoalbuminemia -resolved    Hypernatremia -resolved     Subjective: Speaking a little more today. Just saying yes and no. Fed pt her breakfast     Disposition :tbd,   Review of systems:  Not talking today   Vital signs/Intake and Output:  Visit Vitals  /64   Pulse 86   Temp 98.3 °F (36.8 °C)   Resp 16   Ht 5' (1.524 m)   Wt 51.9 kg (114 lb 6.4 oz)   SpO2 98%   BMI 22.34 kg/m²     Current Shift:  01/30 0701 - 01/30 1900  In: 120 [P.O.:120]  Out: -   Last three shifts:  01/28 1901 - 01/30 0700  In: -   Out: 900 [Urine:900]    Physical Exam:  General: WD, WN. Alert, cooperative, no acute distress    HEENT: NC, Atraumatic. PERRLA, anicteric sclerae. Lungs: CTA Bilaterally. No Wheezing/Rhonchi/Rales. Heart:  Regular  rhythm,  No murmur, No Rubs, No Gallops  Abdomen: Soft, Non distended, Non tender. +Bowel sounds, colectomy bag noted with liquid stool   Extremities: No c/c/e  Psych:   Not anxious or agitated. Neurologic:  No acute neurological deficit.   Answer some questions           Labs: Results:       Chemistry Recent Labs     01/30/22  0350 01/29/22  1032 01/29/22  0245 01/28/22  0000 01/28/22  0000   GLU 87 94 85   < > 84    144 144   < > 146*   K 2.8* 2.3* 2.1*   < > 2.9*  106 107   < > 113*   CO2 29 31 31   < > 24   BUN 2* 2* 2*   < > 6*   CREA 0.44* 0.41* 0.40*   < > 0.50*   CA 8.7 8.5 8.4*   < > 8.0*   AGAP 7 7 6   < > 9   BUCR 5* 5* 5*   < > 12   AP 42*  --  40*  --  50   TP 6.2*  --  5.8*  --  5.2*   ALB 4.4  --  3.8  --  3.2*   GLOB 1.8*  --  2.0  --  2.0   AGRAT 2.4*  --  1.9*  --  1.6    < > = values in this interval not displayed. CBC w/Diff Recent Labs     01/30/22  0350 01/29/22  0245 01/28/22  0000   WBC 5.5 4.4* 5.1   RBC 3.06* 2.56* 2.70*   HGB 8.1* 6.6* 6.8*   HCT 24.8* 20.4* 22.2*    234 253      Cardiac Enzymes No results for input(s): CPK, CKND1, OVIDIO in the last 72 hours. No lab exists for component: CKRMB, TROIP   Coagulation No results for input(s): PTP, INR, APTT, INREXT, INREXT in the last 72 hours. Lipid Panel No results found for: CHOL, CHOLPOCT, CHOLX, CHLST, CHOLV, 006870, HDL, HDLP, LDL, LDLC, DLDLP, 896118, VLDLC, VLDL, TGLX, TRIGL, TRIGP, TGLPOCT, CHHD, CHHDX   BNP No results for input(s): BNPP in the last 72 hours. Liver Enzymes Recent Labs     01/30/22  0350   TP 6.2*   ALB 4.4   AP 42*      Thyroid Studies No results found for: T4, T3U, TSH, TSHEXT, TSHEXT     Procedures/imaging: see electronic medical records for all procedures/Xrays and details which were not copied into this note but were reviewed prior to creation of Plan    CT CHEST ABD PELV W CONT    Result Date: 1/26/2022  EXAM:CT chest abdomen and pelvis with contrast CLINICAL INDICATION/HISTORY: Sepsis, hypotension COMPARISON: None TECHNIQUE: Multislice helical CT was performed through the chest, abdomen and pelvis with IV contrast. Coronal and sagittal reformations were generated. One or more dose reduction techniques were used on this CT: automated exposure control, adjustment of the mAs and/or kVp according to patient's size, and iterative reconstruction techniques.  The specific techniques utilized on this CT exam have been documented in the patient's electronic medical record. Digital imaging and communications and medicine (DICOM) format image data are available to nonaffiliated external healthcare facilities or entities on a secure, media free, reciprocally searchable basis with patient authorization for at least a 12 month period after this study. ========== FINDINGS: ------------------ CHEST: LUNGS: There is streaky and patchy opacities throughout the left lower lobe most pronounced basilar segments and mild patchy opacity basilar right lower lobe. Streaky parenchymal density middle lobe and lingula and mild additional peripheral chronic fibrotic changes with mild biapical scarring. No abnormal mass. PLEURA: Minimal left pleural effusion. AIRWAY: Mild bilateral lower lobe bronchiectasis. MEDIASTINUM: Normal heart and great vessels. No adenopathy. OTHER: Multiple chronic appearing left-sided rib fractures. No acute or aggressive osseous finding. ------------------ ABDOMEN/PELVIS: LIVER/BILIARY: No suspicious liver lesion. No biliary dilation. Gallbladder unremarkable. SPLEEN: Normal. PANCREAS: Normal. ADRENALS: Normal. KIDNEYS: Small nonobstructing bilateral renal calculi. No abnormal mass or obstruction. Benign-appearing left lower pole renal cyst. Ureters and bladder unremarkable. LYMPH NODES: No technically enlarged nodes. GI TRACT: Mildly distended stool-filled rectum with nonspecific air-fluid levels within colon without significant dilatation or wall thickening. No abnormal small bowel dilatation or wall thickening. No ascites or free air. VASCULATURE: Moderate aortoiliac atherosclerotic calcification. PELVIC ORGANS: Previous hysterectomy. OTHER: No aggressive appearing osseous lesion. Multilevel lower lumbar degenerative spondylosis. ==========     1. Bilateral lower lobe pneumonia left greater than right with minimal left pleural effusion with bilateral lower lobe bronchiectasis. Additional mild areas of atelectasis or scarring bilaterally.  2. Mildly distended stool-filled rectum likely related to constipation with no evidence of obstruction. 3. No other acute or significant abdominal or pelvic CT finding. XR CHEST PORT    Result Date: 1/26/2022  EXAM: Portable frontal view of the chest. CLINICAL INDICATION/HISTORY: Hypotension COMPARISON: None. _______________ FINDINGS: Normal heart size with no mediastinal mass. Cardiac device overlying left side heart. There is some hazy abnormal parenchymal density left lung base with slight blunting left lateral costophrenic angle. No other lung consolidation or mass. Multiple chronic appearing left-sided rib fractures. _______________     1. FINDINGS suggestive of pneumonia and/or atelectasis left lower lung, possible small left pleural effusion.

## 2022-01-31 ENCOUNTER — APPOINTMENT (OUTPATIENT)
Dept: CT IMAGING | Age: 87
DRG: 871 | End: 2022-01-31
Attending: HOSPITALIST
Payer: MEDICARE

## 2022-01-31 LAB
ALBUMIN SERPL-MCNC: 3.6 G/DL (ref 3.4–5)
ALBUMIN/GLOB SERPL: 1.9 {RATIO} (ref 0.8–1.7)
ALP SERPL-CCNC: 45 U/L (ref 45–117)
ALT SERPL-CCNC: 27 U/L (ref 13–56)
ANION GAP SERPL CALC-SCNC: 5 MMOL/L (ref 3–18)
AST SERPL-CCNC: 19 U/L (ref 10–38)
BILIRUB SERPL-MCNC: 1.4 MG/DL (ref 0.2–1)
BUN SERPL-MCNC: 5 MG/DL (ref 7–18)
BUN/CREAT SERPL: 12 (ref 12–20)
CALCIUM SERPL-MCNC: 8.2 MG/DL (ref 8.5–10.1)
CHLORIDE SERPL-SCNC: 112 MMOL/L (ref 100–111)
CO2 SERPL-SCNC: 27 MMOL/L (ref 21–32)
CREAT SERPL-MCNC: 0.41 MG/DL (ref 0.6–1.3)
ERYTHROCYTE [DISTWIDTH] IN BLOOD BY AUTOMATED COUNT: 25.9 % (ref 11.6–14.5)
FOLATE SERPL-MCNC: 11.7 NG/ML (ref 3.1–17.5)
GLOBULIN SER CALC-MCNC: 1.9 G/DL (ref 2–4)
GLUCOSE SERPL-MCNC: 73 MG/DL (ref 74–99)
HCT VFR BLD AUTO: 24.1 % (ref 35–45)
HCT VFR BLD AUTO: 24.3 % (ref 35–45)
HGB BLD-MCNC: 7.6 G/DL (ref 12–16)
HGB BLD-MCNC: 7.9 G/DL (ref 12–16)
IRON SATN MFR SERPL: 98 % (ref 20–50)
IRON SERPL-MCNC: 39 UG/DL (ref 50–175)
MAGNESIUM SERPL-MCNC: 2.1 MG/DL (ref 1.6–2.6)
MCH RBC QN AUTO: 25.2 PG (ref 24–34)
MCHC RBC AUTO-ENTMCNC: 31.5 G/DL (ref 31–37)
MCV RBC AUTO: 80.1 FL (ref 78–100)
NRBC # BLD: 0 K/UL (ref 0–0.01)
NRBC BLD-RTO: 0 PER 100 WBC
PLATELET # BLD AUTO: 272 K/UL (ref 135–420)
PMV BLD AUTO: 10.2 FL (ref 9.2–11.8)
POTASSIUM SERPL-SCNC: 3.8 MMOL/L (ref 3.5–5.5)
PROT SERPL-MCNC: 5.5 G/DL (ref 6.4–8.2)
RBC # BLD AUTO: 3.01 M/UL (ref 4.2–5.3)
SODIUM SERPL-SCNC: 144 MMOL/L (ref 136–145)
TIBC SERPL-MCNC: 40 UG/DL (ref 250–450)
VIT B12 SERPL-MCNC: 749 PG/ML (ref 211–911)
WBC # BLD AUTO: 5.1 K/UL (ref 4.6–13.2)

## 2022-01-31 PROCEDURE — 83540 ASSAY OF IRON: CPT

## 2022-01-31 PROCEDURE — 74011250636 HC RX REV CODE- 250/636: Performed by: EMERGENCY MEDICINE

## 2022-01-31 PROCEDURE — 74011250637 HC RX REV CODE- 250/637: Performed by: HOSPITALIST

## 2022-01-31 PROCEDURE — 85027 COMPLETE CBC AUTOMATED: CPT

## 2022-01-31 PROCEDURE — 80053 COMPREHEN METABOLIC PANEL: CPT

## 2022-01-31 PROCEDURE — 74011250636 HC RX REV CODE- 250/636: Performed by: FAMILY MEDICINE

## 2022-01-31 PROCEDURE — 83735 ASSAY OF MAGNESIUM: CPT

## 2022-01-31 PROCEDURE — 74176 CT ABD & PELVIS W/O CONTRAST: CPT

## 2022-01-31 PROCEDURE — 85018 HEMOGLOBIN: CPT

## 2022-01-31 PROCEDURE — 36415 COLL VENOUS BLD VENIPUNCTURE: CPT

## 2022-01-31 PROCEDURE — 74011000258 HC RX REV CODE- 258: Performed by: EMERGENCY MEDICINE

## 2022-01-31 PROCEDURE — 74011000250 HC RX REV CODE- 250: Performed by: FAMILY MEDICINE

## 2022-01-31 PROCEDURE — 65660000000 HC RM CCU STEPDOWN

## 2022-01-31 PROCEDURE — 77010033678 HC OXYGEN DAILY

## 2022-01-31 PROCEDURE — 92526 ORAL FUNCTION THERAPY: CPT

## 2022-01-31 PROCEDURE — 74011250636 HC RX REV CODE- 250/636: Performed by: HOSPITALIST

## 2022-01-31 PROCEDURE — 82607 VITAMIN B-12: CPT

## 2022-01-31 RX ORDER — FACIAL-BODY WIPES
10 EACH TOPICAL DAILY PRN
Status: DISCONTINUED | OUTPATIENT
Start: 2022-01-31 | End: 2022-02-02 | Stop reason: HOSPADM

## 2022-01-31 RX ADMIN — POTASSIUM CHLORIDE 10 MEQ: 7.46 INJECTION, SOLUTION INTRAVENOUS at 00:02

## 2022-01-31 RX ADMIN — FAMOTIDINE 20 MG: 10 INJECTION, SOLUTION INTRAVENOUS at 09:42

## 2022-01-31 RX ADMIN — PIPERACILLIN AND TAZOBACTAM 4.5 G: 4; .5 INJECTION, POWDER, LYOPHILIZED, FOR SOLUTION INTRAVENOUS; PARENTERAL at 16:05

## 2022-01-31 RX ADMIN — PIPERACILLIN AND TAZOBACTAM 4.5 G: 4; .5 INJECTION, POWDER, LYOPHILIZED, FOR SOLUTION INTRAVENOUS; PARENTERAL at 00:02

## 2022-01-31 RX ADMIN — ENOXAPARIN SODIUM 40 MG: 100 INJECTION SUBCUTANEOUS at 09:43

## 2022-01-31 RX ADMIN — SODIUM CHLORIDE, PRESERVATIVE FREE 10 ML: 5 INJECTION INTRAVENOUS at 15:59

## 2022-01-31 RX ADMIN — SODIUM CHLORIDE, PRESERVATIVE FREE 10 ML: 5 INJECTION INTRAVENOUS at 22:10

## 2022-01-31 RX ADMIN — PIPERACILLIN AND TAZOBACTAM 4.5 G: 4; .5 INJECTION, POWDER, LYOPHILIZED, FOR SOLUTION INTRAVENOUS; PARENTERAL at 05:59

## 2022-01-31 RX ADMIN — PIPERACILLIN AND TAZOBACTAM 4.5 G: 4; .5 INJECTION, POWDER, LYOPHILIZED, FOR SOLUTION INTRAVENOUS; PARENTERAL at 22:10

## 2022-01-31 RX ADMIN — Medication 500 MG: at 20:44

## 2022-01-31 RX ADMIN — SODIUM CHLORIDE, PRESERVATIVE FREE 10 ML: 5 INJECTION INTRAVENOUS at 05:59

## 2022-01-31 RX ADMIN — PIPERACILLIN AND TAZOBACTAM 4.5 G: 4; .5 INJECTION, POWDER, LYOPHILIZED, FOR SOLUTION INTRAVENOUS; PARENTERAL at 10:45

## 2022-01-31 NOTE — PROGRESS NOTES
Hospitalist Progress Note-critical care note     Patient: Star Smith MRN: [id number]  CSN: [id number]    YOB: 1935  Age: 80 y.o. Sex: female    DOA: 1/26/2022 LOS:  LOS: 5 days            Chief complaint: sepsis, pna, uti, htn ,respiratory failure with hypoxia      Assessment/Plan         Hospital Problems  Date Reviewed: 1/27/2022          Codes Class Noted POA    Anemia ICD-10-CM: D64.9  ICD-9-CM: 285.9  1/30/2022 Unknown        Memory loss or impairment ICD-10-CM: R41.3  ICD-9-CM: 780.93  1/27/2022 Yes        History of stroke ICD-10-CM: Z86.73  ICD-9-CM: V12.54  1/27/2022 Yes        Hypokalemia ICD-10-CM: E87.6  ICD-9-CM: 276.8  1/27/2022 Unknown        Hypoalbuminemia ICD-10-CM: E88.09  ICD-9-CM: 273.8  1/27/2022 Unknown        * (Principal) Sepsis ([facility] ICD-10-CM: A41.9  ICD-9-CM: 038.9, 995.91  1/26/2022 Yes        UTI (urinary tract infection) ICD-10-CM: N39.0  ICD-9-CM: 599.0  1/26/2022 Yes        Multifocal pneumonia ICD-10-CM: J18.9  ICD-9-CM: 487  1/26/2022 Yes        COPD (chronic obstructive pulmonary disease) ([facility] ICD-10-CM: J44.9  ICD-9-CM: 577  1/26/2022 Yes        HTN (hypertension) ICD-10-CM: I10  ICD-9-CM: 401.9  1/26/2022 Yes        Acute hypoxemic respiratory failure ([facility] ICD-10-CM: J96.01  ICD-9-CM: 518.81  1/26/2022 Yes        Choledocholithiasis ICD-10-CM: K80.50  ICD-9-CM: 574.50  1/26/2022 Yes        Difficulty swallowing ICD-10-CM: R13.10  ICD-9-CM: 787.20  1/26/2022 Yes        Constipation ICD-10-CM: K59.00  ICD-9-CM: 564.00  1/26/2022 Yes                  80year-old female with prior stroke, hypertension, memory impairment, COPD, and choledocholithiasis with chronic gallbladder fistula is admitted for acute hypoxemic respiratory failure with sepsis due to multifocal pneumonia and UTI.       Anemia   Stable above 7, no bleeding reported  Not sure why h/h drop. Dilution?  Vs infection   Will have occult stool and ct abdomen for hematoma   Send for anemia study Acute respiratory failure with hypoxia   Stable at 2 L    Due to pneumonia   Breathing tx as needed   Weaning off nc oxygen   covid 19 test negative     pna-hcap   Continue abx   Zosyn, vancomycin, and Levaquin-now done to zosyn   Incentive spirometry  Duonebs as needed     Hx of Choledocholithiasis with gallbladder fistula-she was deemed not a surgical candidate and she previously pulled out a cholecystostomy tube-repeated ct scan on 1/26:  LIVER/BILIARY: No suspicious liver lesion. No biliary dilation. Gallbladder  unremarkable.     Dysphagia   Aspiration precaution,speech evaluation    Hypokalemia  Potassium -resolved      Memory impairment with history of strokepossible dementia     Hypertensionnot on home medications    Hypoalbuminemia 1.5 albumin on admission   Received albumin infusion    Hypernatremia   Resolved        RN:stable     Pt does not talking, she is alert and mental status no change compared with last Friday. Called [phone]  ,Miguel Pierce   [phone]   All questions have been answered. 35 total min's spent on patient care including >50% on counseling/coordinating care. Discussed the above assessments. also discussed labs, medications and hospital course         Disposition :tbd,   Review of systems:  Not talking   Vital signs/Intake and Output:  Visit Vitals  BP (!) 143/80   Pulse 86   Temp 98.2 °F (36.8 °C)   Resp 16   Ht 5' (1.524 m)   Wt 50.5 kg (111 lb 4.8 oz)   SpO2 97%   BMI 21.74 kg/m²     Current Shift:  No intake/output data recorded. Last three shifts:  01/29 1901 - 01/31 0700  In: 120 [P.O.:120]  Out: 100     Physical Exam:  General: WD, WN. Alert, some  cooperative, no acute distress    HEENT: NC, Atraumatic. PERRLA, anicteric sclerae. Lungs: CTA Bilaterally. No Wheezing/Rhonchi/Rales. Heart:  Regular  rhythm,  No murmur, No Rubs, No Gallops  Abdomen: Soft, Non distended, Non tender.   +Bowel sounds, bag noted with empty bag   Extremities: No c/c/e  Psych:   Not anxious or agitated. Neurologic:  Alert and follow some command,           Labs: Results:       Chemistry Recent Labs     01/31/22  0455 01/30/22  1450 01/30/22  0350 01/29/22  1032 01/29/22  0245   GLU 73* 99 87   < > 85    144 145   < > 144   K 3.8 3.0* 2.8*   < > 2.1*   * 112* 109   < > 107   CO2 27 28 29   < > 31   BUN 5* 4* 2*   < > 2*   CREA 0.41* 0.43* 0.44*   < > 0.40*   CA 8.2* 8.4* 8.7   < > 8.4*   AGAP 5 4 7   < > 6   BUCR 12 9* 5*   < > 5*   AP 45  --  42*  --  40*   TP 5.5*  --  6.2*  --  5.8*   ALB 3.6  --  4.4  --  3.8   GLOB 1.9*  --  1.8*  --  2.0   AGRAT 1.9*  --  2.4*  --  1.9*    < > = values in this interval not displayed. CBC w/Diff Recent Labs     01/31/22  0940 01/31/22  0455 01/30/22  0350 01/29/22  0245 01/29/22  0245   WBC  --  5.1 5.5  --  4.4*   RBC  --  3.01* 3.06*  --  2.56*   HGB 7.9* 7.6* 8.1*   < > 6.6*   HCT 24.3* 24.1* 24.8*   < > 20.4*   PLT  --  272 280  --  234    < > = values in this interval not displayed. Cardiac Enzymes No results for input(s): CPK, CKND1, OVIDIO in the last 72 hours. No lab exists for component: CKRMB, TROIP   Coagulation No results for input(s): PTP, INR, APTT, INREXT, INREXT in the last 72 hours. Lipid Panel No results found for: CHOL, CHOLPOCT, CHOLX, CHLST, CHOLV, 082522, HDL, HDLP, LDL, LDLC, DLDLP, 427467, VLDLC, VLDL, TGLX, TRIGL, TRIGP, TGLPOCT, CHHD, CHHDX   BNP No results for input(s): BNPP in the last 72 hours.    Liver Enzymes Recent Labs     01/31/22  0455   TP 5.5*   ALB 3.6   AP 45      Thyroid Studies No results found for: T4, T3U, TSH, TSHEXT, TSHEXT     Procedures/imaging: see electronic medical records for all procedures/Xrays and details which were not copied into this note but were reviewed prior to creation of Plan    CT CHEST ABD PELV W CONT    Result Date: 1/26/2022  EXAM:CT chest abdomen and pelvis with contrast CLINICAL INDICATION/HISTORY: Sepsis, hypotension COMPARISON: None TECHNIQUE: Multislice helical CT was performed through the chest, abdomen and pelvis with IV contrast. Coronal and sagittal reformations were generated. One or more dose reduction techniques were used on this CT: automated exposure control, adjustment of the mAs and/or kVp according to patient's size, and iterative reconstruction techniques. The specific techniques utilized on this CT exam have been documented in the patient's electronic medical record. Digital imaging and communications and medicine (DICOM) format image data are available to nonaffiliated external healthcare facilities or entities on a secure, media free, reciprocally searchable basis with patient authorization for at least a 12 month period after this study. ========== FINDINGS: ------------------ CHEST: LUNGS: There is streaky and patchy opacities throughout the left lower lobe most pronounced basilar segments and mild patchy opacity basilar right lower lobe. Streaky parenchymal density middle lobe and lingula and mild additional peripheral chronic fibrotic changes with mild biapical scarring. No abnormal mass. PLEURA: Minimal left pleural effusion. AIRWAY: Mild bilateral lower lobe bronchiectasis. MEDIASTINUM: Normal heart and great vessels. No adenopathy. OTHER: Multiple chronic appearing left-sided rib fractures. No acute or aggressive osseous finding. ------------------ ABDOMEN/PELVIS: LIVER/BILIARY: No suspicious liver lesion. No biliary dilation. Gallbladder unremarkable. SPLEEN: Normal. PANCREAS: Normal. ADRENALS: Normal. KIDNEYS: Small nonobstructing bilateral renal calculi. No abnormal mass or obstruction. Benign-appearing left lower pole renal cyst. Ureters and bladder unremarkable. LYMPH NODES: No technically enlarged nodes. GI TRACT: Mildly distended stool-filled rectum with nonspecific air-fluid levels within colon without significant dilatation or wall thickening. No abnormal small bowel dilatation or wall thickening. No ascites or free air.  VASCULATURE: Moderate aortoiliac atherosclerotic calcification. PELVIC ORGANS: Previous hysterectomy. OTHER: No aggressive appearing osseous lesion. Multilevel lower lumbar degenerative spondylosis. ==========     1. Bilateral lower lobe pneumonia left greater than right with minimal left pleural effusion with bilateral lower lobe bronchiectasis. Additional mild areas of atelectasis or scarring bilaterally. 2. Mildly distended stool-filled rectum likely related to constipation with no evidence of obstruction. 3. No other acute or significant abdominal or pelvic CT finding. XR CHEST PORT    Result Date: 1/26/2022  EXAM: Portable frontal view of the chest. CLINICAL INDICATION/HISTORY: Hypotension COMPARISON: None. _______________ FINDINGS: Normal heart size with no mediastinal mass. Cardiac device overlying left side heart. There is some hazy abnormal parenchymal density left lung base with slight blunting left lateral costophrenic angle. No other lung consolidation or mass. Multiple chronic appearing left-sided rib fractures. _______________     1. FINDINGS suggestive of pneumonia and/or atelectasis left lower lung, possible small left pleural effusion.       Michele Cabrera MD

## 2022-01-31 NOTE — PROGRESS NOTES
Physician Progress Note      Karen Martinez  CSN #:                  [id number]  :                       1935  ADMIT DATE:       2022 4:40 PM  [address] [place] DATE:  RESPONDING  PROVIDER #:        Gisselle DANIEL MD        QUERY TEXT:    Type of Anemia: Please provide further specificity, if known. Clinical indicators include: anemia, hgb, rbc, hct, bleeding, h/h, dilution, hematoma  Options provided:  -- Anemia due to acute blood loss  -- Anemia due to chronic blood loss  -- Anemia due to iron deficiency  -- Anemia due to postoperative blood loss  -- Anemia due to chronic disease  -- Other - I will add my own diagnosis  -- Disagree - Not applicable / Not valid  -- Disagree - Clinically Unable to determine / Unknown        PROVIDER RESPONSE TEXT:    The patient has anemia due to chronic disease.       Electronically signed by:  Gisselle DANIEL MD 2022 5:08 PM

## 2022-01-31 NOTE — PROGRESS NOTES
DC Plan: return to the [facility] when medically stable    Care manager notified of potential for discharge in 1-2 days back to the [facility]; have left voice mail requesting call back from son Thomas Loza to review. CM has alerted admissions coordinator at [facility] of potential readiness to return and in preparation have ordered rapid covid. Received information from Palliative care of discussion with son and concern re: perception that patient is not being assisted with feeding and have passed this concern on to the [facility] for them to clarify and discuss with son. Care Management Interventions  PCP Verified by CM:  Yes  Mode of Transport at Discharge: Self  Transition of Care Consult (CM Consult): Discharge [address]: Child(feliberto)  Confirm Follow Up Transport: Other (see comment)  The Plan for Transition of Care is Related to the Following Treatment Goals : sepsis  The Patient and/or Patient Representative was Provided with a Choice of Provider and Agrees with the Discharge Plan?: Yes  Name of the Patient Representative Who was Provided with a Choice of Provider and Agrees with the Discharge Plan: ernestina Torres  [place] of Choice List was Provided with Basic Dialogue that Supports the Patient's Individualized Plan of Care/Goals, Treatment Preferences and Shares the Quality Data Associated with the Providers?: Yes  Discharge Location  [facility]

## 2022-01-31 NOTE — PROGRESS NOTES
SPEECH LANGUAGE PATHOLOGY DYSPHAGIA TREATMENT    Patient: Michelle Castañeda (27 y.o. female)  Date: 1/31/2022  Diagnosis: Sepsis ([facility] [A41.9] Sepsis ([facility]       Precautions: Fall,Skin, Aspiration  PLOF: Long-term care; Prior CVA; History of Swallowing difficulties    ASSESSMENT:  ST attempted this morning to assess diet tolerance and safety. Nursing in room during [address]. Patient seated upright in bed but appeared drowsy and confused. Nursing provided medication crushed via spoon and applesauce. Patient accepted one bite with no overt s/sx of aspiration. Patient refused subsequent bites of puree by turning head and refusing to open mouth. Patient accepted PO trials x 5 of thin liquid via straw with no s/sx of aspiration. Oral care attempted but patient refused to open mouth. Session d/c when patient indicated that she wanted ST to stop via head nod. Continue diet and POC as noted. D/w nursing. Progression toward goals:  []         Improving appropriately and progressing toward goals  [x]         Improving slowly and progressing toward goals  []         Not making progress toward goals and plan of care will be adjusted     PLAN:  Recommendations and Planned Interventions:  See above  Patient continues to benefit from skilled intervention to address the above impairments. Continue treatment per established plan of care.   Discharge Recommendations:  [place] and To Be Determined     SUBJECTIVE:   Patient stated: Patient did not speak     OBJECTIVE:   Cognitive and Communication Status:  Neurologic State: Confused,Drowsy  Orientation Level: Unable to verbalize  Cognition: Decreased attention/concentration,Decreased command following  Perception: Verbal,Visual  Perseveration: No perseveration noted  Safety/Judgement: Decreased insight into deficits  Dysphagia Treatment:  Oral Assessment:  Oral Assessment  Labial: No impairment  Dentition: Limited  Oral Hygiene:  (attempted oral care but patient refused)  Lingual: Decreased rate,Decreased strength  Velum: Unable to visualize  Mandible: No impairment  Gag Reflex:  (did not assess)  P.O. Trials:   Patient Position:  (upright in bed)   Vocal quality prior to P.O.: Other (comment) (did not speak today)   Consistency Presented: Puree,Thin liquid   How Presented: SLP-fed/presented,Spoon,Straw       Bolus Acceptance: Other (comment) (pt took one bite of puree and then refused; accepted thin)   Bolus Formation/Control: Impaired   Type of Impairment: Lip closure,Mastication,Piecemeal   Propulsion: Delayed (# of seconds),Discoordination   Oral Residue: Greater than 50% of bolus,Lingual   Initiation of Swallow: Delayed (# of seconds)   Laryngeal Elevation: Functional   Aspiration Signs/Symptoms: None   Pharyngeal Phase Characteristics: Easily fatigued ,Poor endurance   Effective Modifications: Alternate liquids/solids,Straw,Small sips and bites   Cues for Modifications: Minimal-moderate   Comments:  (Patient refused PO of puree; oral care)     Oral Phase Severity: Mild-moderate   Pharyngeal Phase Severity : No impairment      PAIN:  Pain level pre-treatment: 0/10   Pain level post-treatment: 0/10     After treatment:   []              Patient left in no apparent distress sitting up in chair  [x]              Patient left in no apparent distress in bed  []              Call bell left within reach  [x]              Nursing notified  []              Family present  []              Caregiver present  []              Bed alarm activated      COMMUNICATION/EDUCATION:   [x] Aspiration precautions; swallow safety; compensatory techniques  [x]        Patient unable to participate in education; education ongoing with staff  []  Posted safety precautions in patient's room.   [] Oral-motor/laryngeal strengthening exercises      DIOGO Catalan  Time Calculation: 15 mins

## 2022-01-31 NOTE — PROGRESS NOTES
Attempted to see patient for PT treatment however she is currently off the floor. Will continue to follow.   Chip Monge, PT

## 2022-01-31 NOTE — PROGRESS NOTES
Palliative Medicine    CODE STATUS: DNR/DNI    AMD Status: on file naming her son, Mitra Pryor, as her MPOA     1/31/2022 1000 Seen today in room 357. Working with SLP to take medications--she did not. She had eaten some of her breakfast when fed. Did not speak. Respirations unlabored. 1230: telephone conversation with Bryanna Hinkle, son. He said that he had just spoke with Dr Mitch Miller and is aware of the plans for his mother to retrun to her facility soon. His biggest concern is that the staff at her SNF will not be present enough to feed his mother safely. This information was relayed to care manager, Samina Sherman. We did discuss that his mother's chronic aspiration may be the stepping stone for a discussion about transitioning to comfort care in the future and he agreed and is open to the possibility. He will consider that if she has to be hospitalized for another pneumonia soon. Disposition plan: anticipate return to SNF    Palliative care will continue to follow Kritsin Salinas  and her family during her hospitalization and support them as they make healthcare decisions and define goals of care.       Sal Castañeda RN, MSN  Palliative Medicine  P: [phone]

## 2022-02-01 PROBLEM — E83.42 HYPOMAGNESEMIA: Status: ACTIVE | Noted: 2022-02-01

## 2022-02-01 LAB
ALBUMIN SERPL-MCNC: 3.4 G/DL (ref 3.4–5)
ALBUMIN/GLOB SERPL: 1.7 {RATIO} (ref 0.8–1.7)
ALP SERPL-CCNC: 45 U/L (ref 45–117)
ALT SERPL-CCNC: 27 U/L (ref 13–56)
ANION GAP SERPL CALC-SCNC: 7 MMOL/L (ref 3–18)
ANION GAP SERPL CALC-SCNC: 7 MMOL/L (ref 3–18)
AST SERPL-CCNC: 17 U/L (ref 10–38)
BACTERIA SPEC CULT: NORMAL
BACTERIA SPEC CULT: NORMAL
BILIRUB SERPL-MCNC: 1.3 MG/DL (ref 0.2–1)
BUN SERPL-MCNC: 5 MG/DL (ref 7–18)
BUN SERPL-MCNC: 5 MG/DL (ref 7–18)
BUN/CREAT SERPL: 15 (ref 12–20)
BUN/CREAT SERPL: 16 (ref 12–20)
CALCIUM SERPL-MCNC: 8 MG/DL (ref 8.5–10.1)
CALCIUM SERPL-MCNC: 8 MG/DL (ref 8.5–10.1)
CHLORIDE SERPL-SCNC: 108 MMOL/L (ref 100–111)
CHLORIDE SERPL-SCNC: 110 MMOL/L (ref 100–111)
CO2 SERPL-SCNC: 26 MMOL/L (ref 21–32)
CO2 SERPL-SCNC: 26 MMOL/L (ref 21–32)
COVID-19 RAPID TEST, COVR: NOT DETECTED
CREAT SERPL-MCNC: 0.32 MG/DL (ref 0.6–1.3)
CREAT SERPL-MCNC: 0.34 MG/DL (ref 0.6–1.3)
ERYTHROCYTE [DISTWIDTH] IN BLOOD BY AUTOMATED COUNT: 25.8 % (ref 11.6–14.5)
GLOBULIN SER CALC-MCNC: 2 G/DL (ref 2–4)
GLUCOSE BLD STRIP.AUTO-MCNC: 75 MG/DL (ref 70–110)
GLUCOSE SERPL-MCNC: 71 MG/DL (ref 74–99)
GLUCOSE SERPL-MCNC: 76 MG/DL (ref 74–99)
HCT VFR BLD AUTO: 24.6 % (ref 35–45)
HGB BLD-MCNC: 8.1 G/DL (ref 12–16)
MAGNESIUM SERPL-MCNC: 0.9 MG/DL (ref 1.6–2.6)
MCH RBC QN AUTO: 26.2 PG (ref 24–34)
MCHC RBC AUTO-ENTMCNC: 32.9 G/DL (ref 31–37)
MCV RBC AUTO: 79.6 FL (ref 78–100)
NRBC # BLD: 0 K/UL (ref 0–0.01)
NRBC BLD-RTO: 0 PER 100 WBC
PLATELET # BLD AUTO: 218 K/UL (ref 135–420)
PMV BLD AUTO: 10.7 FL (ref 9.2–11.8)
POTASSIUM SERPL-SCNC: 2.9 MMOL/L (ref 3.5–5.5)
POTASSIUM SERPL-SCNC: 4.1 MMOL/L (ref 3.5–5.5)
PROT SERPL-MCNC: 5.4 G/DL (ref 6.4–8.2)
RBC # BLD AUTO: 3.09 M/UL (ref 4.2–5.3)
SERVICE CMNT-IMP: NORMAL
SERVICE CMNT-IMP: NORMAL
SODIUM SERPL-SCNC: 141 MMOL/L (ref 136–145)
SODIUM SERPL-SCNC: 143 MMOL/L (ref 136–145)
SOURCE, COVRS: NORMAL
WBC # BLD AUTO: 5.5 K/UL (ref 4.6–13.2)

## 2022-02-01 PROCEDURE — 74011000250 HC RX REV CODE- 250: Performed by: FAMILY MEDICINE

## 2022-02-01 PROCEDURE — 83735 ASSAY OF MAGNESIUM: CPT

## 2022-02-01 PROCEDURE — 80053 COMPREHEN METABOLIC PANEL: CPT

## 2022-02-01 PROCEDURE — 74011250636 HC RX REV CODE- 250/636: Performed by: HOSPITALIST

## 2022-02-01 PROCEDURE — 82962 GLUCOSE BLOOD TEST: CPT

## 2022-02-01 PROCEDURE — 74011000258 HC RX REV CODE- 258: Performed by: EMERGENCY MEDICINE

## 2022-02-01 PROCEDURE — 74011250636 HC RX REV CODE- 250/636: Performed by: EMERGENCY MEDICINE

## 2022-02-01 PROCEDURE — 87635 SARS-COV-2 COVID-19 AMP PRB: CPT

## 2022-02-01 PROCEDURE — 85027 COMPLETE CBC AUTOMATED: CPT

## 2022-02-01 PROCEDURE — 74011250636 HC RX REV CODE- 250/636: Performed by: INTERNAL MEDICINE

## 2022-02-01 PROCEDURE — 36415 COLL VENOUS BLD VENIPUNCTURE: CPT

## 2022-02-01 PROCEDURE — 92526 ORAL FUNCTION THERAPY: CPT

## 2022-02-01 PROCEDURE — 74011250637 HC RX REV CODE- 250/637: Performed by: HOSPITALIST

## 2022-02-01 PROCEDURE — 74011250636 HC RX REV CODE- 250/636: Performed by: FAMILY MEDICINE

## 2022-02-01 PROCEDURE — 65660000000 HC RM CCU STEPDOWN

## 2022-02-01 RX ORDER — MAGNESIUM SULFATE HEPTAHYDRATE 40 MG/ML
4 INJECTION, SOLUTION INTRAVENOUS ONCE
Status: COMPLETED | OUTPATIENT
Start: 2022-02-01 | End: 2022-02-01

## 2022-02-01 RX ORDER — POTASSIUM CHLORIDE 7.45 MG/ML
10 INJECTION INTRAVENOUS
Status: COMPLETED | OUTPATIENT
Start: 2022-02-01 | End: 2022-02-01

## 2022-02-01 RX ORDER — MAGNESIUM SULFATE HEPTAHYDRATE 40 MG/ML
2 INJECTION, SOLUTION INTRAVENOUS
Status: COMPLETED | OUTPATIENT
Start: 2022-02-01 | End: 2022-02-01

## 2022-02-01 RX ADMIN — SODIUM CHLORIDE, PRESERVATIVE FREE 10 ML: 5 INJECTION INTRAVENOUS at 18:46

## 2022-02-01 RX ADMIN — MAGNESIUM SULFATE HEPTAHYDRATE 2 G: 40 INJECTION, SOLUTION INTRAVENOUS at 12:12

## 2022-02-01 RX ADMIN — POTASSIUM CHLORIDE 10 MEQ: 10 INJECTION, SOLUTION INTRAVENOUS at 04:45

## 2022-02-01 RX ADMIN — MAGNESIUM SULFATE HEPTAHYDRATE 2 G: 40 INJECTION, SOLUTION INTRAVENOUS at 08:52

## 2022-02-01 RX ADMIN — POTASSIUM CHLORIDE 10 MEQ: 10 INJECTION, SOLUTION INTRAVENOUS at 02:15

## 2022-02-01 RX ADMIN — PIPERACILLIN AND TAZOBACTAM 4.5 G: 4; .5 INJECTION, POWDER, LYOPHILIZED, FOR SOLUTION INTRAVENOUS; PARENTERAL at 22:45

## 2022-02-01 RX ADMIN — PIPERACILLIN AND TAZOBACTAM 4.5 G: 4; .5 INJECTION, POWDER, LYOPHILIZED, FOR SOLUTION INTRAVENOUS; PARENTERAL at 12:12

## 2022-02-01 RX ADMIN — PIPERACILLIN AND TAZOBACTAM 4.5 G: 4; .5 INJECTION, POWDER, LYOPHILIZED, FOR SOLUTION INTRAVENOUS; PARENTERAL at 18:46

## 2022-02-01 RX ADMIN — POTASSIUM CHLORIDE 10 MEQ: 10 INJECTION, SOLUTION INTRAVENOUS at 07:27

## 2022-02-01 RX ADMIN — POTASSIUM CHLORIDE 10 MEQ: 10 INJECTION, SOLUTION INTRAVENOUS at 08:51

## 2022-02-01 RX ADMIN — FAMOTIDINE 20 MG: 10 INJECTION, SOLUTION INTRAVENOUS at 08:51

## 2022-02-01 RX ADMIN — ENOXAPARIN SODIUM 40 MG: 100 INJECTION SUBCUTANEOUS at 08:51

## 2022-02-01 RX ADMIN — POTASSIUM CHLORIDE 10 MEQ: 10 INJECTION, SOLUTION INTRAVENOUS at 05:39

## 2022-02-01 RX ADMIN — ONDANSETRON 4 MG: 2 INJECTION INTRAMUSCULAR; INTRAVENOUS at 05:49

## 2022-02-01 RX ADMIN — PIPERACILLIN AND TAZOBACTAM 4.5 G: 4; .5 INJECTION, POWDER, LYOPHILIZED, FOR SOLUTION INTRAVENOUS; PARENTERAL at 05:38

## 2022-02-01 RX ADMIN — MAGNESIUM SULFATE HEPTAHYDRATE 4 G: 40 INJECTION, SOLUTION INTRAVENOUS at 03:51

## 2022-02-01 RX ADMIN — POTASSIUM CHLORIDE 10 MEQ: 10 INJECTION, SOLUTION INTRAVENOUS at 03:51

## 2022-02-01 NOTE — PROGRESS NOTES
Palliative Medicine    After meeting with patient and her brother, Elodia Gonzalez [facility]) the goals of care have been defined. Code status remains: DNR  AMD status: on file naming her brother as her MPOA Will sign off but remain available for reconsult as needed/if appropriate.      Thank you for the Palliative Medicine consult and allowing us to participate in the care of Ivonne Jurado RN, MSN  Palliative Medicine     [phone]

## 2022-02-01 NOTE — PROGRESS NOTES
Problem: Pressure Injury - Risk of  Goal: *Prevention of pressure injury  Description: Document Joey Scale and appropriate interventions in the flowsheet. Outcome: Progressing Towards Goal  Note: Pressure Injury Interventions:  Sensory Interventions: Discuss PT/OT consult with provider    Moisture Interventions: Internal/External urinary devices    Activity Interventions: Increase time out of bed    Mobility Interventions: PT/OT evaluation    Nutrition Interventions: Document food/fluid/supplement intake,Offer support with meals,snacks and hydration    Friction and Shear Interventions: Lift sheet,Lift team/patient mobility team                Problem: Patient Education: Go to Patient Education Activity  Goal: Patient/Family Education  Outcome: Progressing Towards Goal     Problem: Falls - Risk of  Goal: *Absence of Falls  Description: Document Steve Willams Fall Risk and appropriate interventions in the flowsheet.   Outcome: Progressing Towards Goal  Note: Fall Risk Interventions:  Mobility Interventions: Bed/chair exit alarm    Mentation Interventions: Door open when patient unattended    Medication Interventions: Assess postural VS orthostatic hypotension    Elimination Interventions: Call light in reach              Problem: Patient Education: Go to Patient Education Activity  Goal: Patient/Family Education  Outcome: Progressing Towards Goal     Problem: Patient Education: Go to Patient Education Activity  Goal: Patient/Family Education  Outcome: Progressing Towards Goal     Problem: Patient Education: Go to Patient Education Activity  Goal: Patient/Family Education  Outcome: Progressing Towards Goal     Problem: Patient Education: Go to Patient Education Activity  Goal: Patient/Family Education  Outcome: Progressing Towards Goal

## 2022-02-01 NOTE — PROGRESS NOTES
2/1/2022  PT treatment not completed due to:  [] Off Unit for testing/procedure  [] Pain  [] Eating  [] Patient too lethargic  [] Nausea/vomiting  [] Dialysis treatment in progress   [x] Other: treatment attempted. Pt shakes head for \"no\" in response to [facility] are you? \" and questions regarding eating food (as meal tray present). Otherwise pt does not respond in any way to verbalizations and simply looks at the television. No treatment given this date. Notified pt nurse Sahra Schwab of above. Nurse reports pt continues to present as above. Pt awaiting SNF transfer tomorrow per care mgleighann Lloyd.   Christina Bruce, PT

## 2022-02-01 NOTE — PROGRESS NOTES
Hospitalist Progress Note-critical care note     Patient: Alexander Leon MRN: [id number]  [facility]: [id number]    YOB: 1935  Age: 80 y.o.   Sex: female    DOA: 1/26/2022 LOS:  LOS: 6 days            Chief complaint: sepsis, pna, uti, htn ,respiratory failure with hypoxia      Assessment/Plan         Hospital Problems  Date Reviewed: 1/27/2022          Codes Class Noted POA    Anemia ICD-10-CM: D64.9  ICD-9-CM: 285.9  1/30/2022 Unknown        Memory loss or impairment ICD-10-CM: R41.3  ICD-9-CM: 780.93  1/27/2022 Yes        History of stroke ICD-10-CM: Z86.73  ICD-9-CM: V12.54  1/27/2022 Yes        Hypokalemia ICD-10-CM: E87.6  ICD-9-CM: 276.8  1/27/2022 Unknown        Hypoalbuminemia ICD-10-CM: E88.09  ICD-9-CM: 273.8  1/27/2022 Unknown        * (Principal) Sepsis ([facility] ICD-10-CM: A41.9  ICD-9-CM: 038.9, 995.91  1/26/2022 Yes        UTI (urinary tract infection) ICD-10-CM: N39.0  ICD-9-CM: 599.0  1/26/2022 Yes        Multifocal pneumonia ICD-10-CM: J18.9  ICD-9-CM: 386  1/26/2022 Yes        COPD (chronic obstructive pulmonary disease) ([facility] ICD-10-CM: J44.9  ICD-9-CM: 557  1/26/2022 Yes        HTN (hypertension) ICD-10-CM: I10  ICD-9-CM: 401.9  1/26/2022 Yes        Acute hypoxemic respiratory failure ([facility] ICD-10-CM: J96.01  ICD-9-CM: 518.81  1/26/2022 Yes        Choledocholithiasis ICD-10-CM: K80.50  ICD-9-CM: 574.50  1/26/2022 Yes        Difficulty swallowing ICD-10-CM: R13.10  ICD-9-CM: 787.20  1/26/2022 Yes        Constipation ICD-10-CM: K59.00  ICD-9-CM: 564.00  1/26/2022 Yes                  80year-old female with prior stroke, hypertension, memory impairment, COPD, and choledocholithiasis with chronic gallbladder fistula is admitted for acute hypoxemic respiratory failure with sepsis due to multifocal pneumonia and UTI.       Anemia   Stable   Like due to Dilution and infection   ct abdomen for no  hematoma   Iron low will give some iron     Acute respiratory failure with hypoxia   Resolved Due to pneumonia   Breathing tx as needed   Weaning off nc oxygen   covid 19 test negative     pna-hcap   Continue abx   Zosyn, vancomycin, and Levaquin-now done to zosyn   Incentive spirometry  Duonebs as needed     Hx of Choledocholithiasis with gallbladder fistula-she was deemed not a surgical candidate and she previously pulled out a cholecystostomy tube-now bag placed before admission-confirmed with son yesterday      Dysphagia   Aspiration precaution,speech evaluation    Hypokalemia  Potassium -resolved      Memory impairment with history of strokepossible dementia     Hypertensionnot on home medications    Hypoalbuminemia 1.5 albumin on admission   Received albumin infusion    Hypernatremia   Resolved      Hypokalemia and hypomagnesemia   K and mg replaced   Repeated k , repeated mg still pending -sample has to [facility] to [facility]-talked with  Lab-still pending-the results will not be back in 1-2 hrs. Will hold d.c for today due to pending mg      Disposition :tomorrow   Review of systems:  Not talking   Vital signs/Intake and Output:  Visit Vitals  BP (!) 127/57   Pulse 88   Temp 97.9 °F (36.6 °C)   Resp 18   Ht 5' (1.524 m)   Wt 52.3 kg (115 lb 4.8 oz)   SpO2 94%   BMI 22.52 kg/m²     Current Shift:  02/01 0701 - 02/01 1900  In: 120 [P.O.:120]  Out: 400 [Urine:400]  Last three shifts:  01/30 1901 - 02/01 0700  In: 180 [P.O.:180]  Out: 100     Physical Exam:  General: WD, WN. Alert, some  cooperative, no acute distress    HEENT: NC, Atraumatic. PERRLA, anicteric sclerae. Lungs: CTA Bilaterally. No Wheezing/Rhonchi/Rales. Heart:  Regular  rhythm,  No murmur, No Rubs, No Gallops  Abdomen: Soft, Non distended, Non tender. +Bowel sounds, bag noted with empty bag   Extremities: No c/c/e  Psych:   Not anxious or agitated.   Neurologic:  Alert and follow some command,           Labs: Results:       Chemistry Recent Labs     02/01/22  1110 02/01/22  0200 01/31/22  0455 01/30/22  1450 01/30/22  0350   GLU 71* 76 73*   < > 87    143 144   < > 145   K 4.1 2.9* 3.8   < > 2.8*    110 112*   < > 109   CO2 26 26 27   < > 29   BUN 5* 5* 5*   < > 2*   CREA 0.32* 0.34* 0.41*   < > 0.44*   CA 8.0* 8.0* 8.2*   < > 8.7   AGAP 7 7 5   < > 7   BUCR 16 15 12   < > 5*   AP  --  45 45  --  42*   TP  --  5.4* 5.5*  --  6.2*   ALB  --  3.4 3.6  --  4.4   GLOB  --  2.0 1.9*  --  1.8*   AGRAT  --  1.7 1.9*  --  2.4*    < > = values in this interval not displayed. CBC w/Diff Recent Labs     02/01/22  0200 01/31/22  0940 01/31/22  0455 01/30/22  0350 01/30/22  0350   WBC 5.5  --  5.1  --  5.5   RBC 3.09*  --  3.01*  --  3.06*   HGB 8.1* 7.9* 7.6*   < > 8.1*   HCT 24.6* 24.3* 24.1*   < > 24.8*     --  272  --  280    < > = values in this interval not displayed. Cardiac Enzymes No results for input(s): CPK, CKND1, OVIDIO in the last 72 hours. No lab exists for component: CKRMB, TROIP   Coagulation No results for input(s): PTP, INR, APTT, INREXT, INREXT in the last 72 hours. Lipid Panel No results found for: CHOL, CHOLPOCT, CHOLX, CHLST, CHOLV, 196187, HDL, HDLP, LDL, LDLC, DLDLP, 052203, VLDLC, VLDL, TGLX, TRIGL, TRIGP, TGLPOCT, CHHD, CHHDX   BNP No results for input(s): BNPP in the last 72 hours. Liver Enzymes Recent Labs     02/01/22  0200   TP 5.4*   ALB 3.4   AP 45      Thyroid Studies No results found for: T4, T3U, TSH, TSHEXT, TSHEXT     Procedures/imaging: see electronic medical records for all procedures/Xrays and details which were not copied into this note but were reviewed prior to creation of Plan    CT CHEST ABD PELV W CONT    Result Date: 1/26/2022  EXAM:CT chest abdomen and pelvis with contrast CLINICAL INDICATION/HISTORY: Sepsis, hypotension COMPARISON: None TECHNIQUE: Multislice helical CT was performed through the chest, abdomen and pelvis with IV contrast. Coronal and sagittal reformations were generated.  One or more dose reduction techniques were used on this CT: automated exposure control, adjustment of the mAs and/or kVp according to patient's size, and iterative reconstruction techniques. The specific techniques utilized on this CT exam have been documented in the patient's electronic medical record. Digital imaging and communications and medicine (DICOM) format image data are available to nonaffiliated external healthcare facilities or entities on a secure, media free, reciprocally searchable basis with patient authorization for at least a 12 month period after this study. ========== FINDINGS: ------------------ CHEST: LUNGS: There is streaky and patchy opacities throughout the left lower lobe most pronounced basilar segments and mild patchy opacity basilar right lower lobe. Streaky parenchymal density middle lobe and lingula and mild additional peripheral chronic fibrotic changes with mild biapical scarring. No abnormal mass. PLEURA: Minimal left pleural effusion. AIRWAY: Mild bilateral lower lobe bronchiectasis. MEDIASTINUM: Normal heart and great vessels. No adenopathy. OTHER: Multiple chronic appearing left-sided rib fractures. No acute or aggressive osseous finding. ------------------ ABDOMEN/PELVIS: LIVER/BILIARY: No suspicious liver lesion. No biliary dilation. Gallbladder unremarkable. SPLEEN: Normal. PANCREAS: Normal. ADRENALS: Normal. KIDNEYS: Small nonobstructing bilateral renal calculi. No abnormal mass or obstruction. Benign-appearing left lower pole renal cyst. Ureters and bladder unremarkable. LYMPH NODES: No technically enlarged nodes. GI TRACT: Mildly distended stool-filled rectum with nonspecific air-fluid levels within colon without significant dilatation or wall thickening. No abnormal small bowel dilatation or wall thickening. No ascites or free air. VASCULATURE: Moderate aortoiliac atherosclerotic calcification. PELVIC ORGANS: Previous hysterectomy. OTHER: No aggressive appearing osseous lesion.  Multilevel lower lumbar degenerative spondylosis. ==========     1. Bilateral lower lobe pneumonia left greater than right with minimal left pleural effusion with bilateral lower lobe bronchiectasis. Additional mild areas of atelectasis or scarring bilaterally. 2. Mildly distended stool-filled rectum likely related to constipation with no evidence of obstruction. 3. No other acute or significant abdominal or pelvic CT finding. XR CHEST PORT    Result Date: 1/26/2022  EXAM: Portable frontal view of the chest. CLINICAL INDICATION/HISTORY: Hypotension COMPARISON: None. _______________ FINDINGS: Normal heart size with no mediastinal mass. Cardiac device overlying left side heart. There is some hazy abnormal parenchymal density left lung base with slight blunting left lateral costophrenic angle. No other lung consolidation or mass. Multiple chronic appearing left-sided rib fractures. _______________     1. FINDINGS suggestive of pneumonia and/or atelectasis left lower lung, possible small left pleural effusion.       Barrington Wilson MD

## 2022-02-01 NOTE — PROGRESS NOTES
Physician Progress Note      Usha Fleming  CSN #:                  [id number]  :                       1935  ADMIT DATE:       2022 4:40 PM  [address] [place] DATE:  RESPONDING  PROVIDER #:        Johny DANIEL MD          QUERY TEXT:    Pt admitted with Sepsis  due to multifocal pneumonia and UTI  . Pt noted to have pdx  hcap pna and has  pmh of CVA and dysphagia . If possible, please document in the progress notes and discharge summary if you are evaluating and/or treating any of the following:    Note: CAP and HCAP indicate where the pneumonia was acquired, not a specific type. The medical record reflects the following:  Risk Factors: FPC pt ,PMH stroke , copd and HTN with difficultly swallowing    Clinical Indicators: admitted with sepsis with acute respiratory failure,RR 29  in ED .  CT lungs There is streaky and patchy opacities throughout the left lower lobe most pronounced basilar segments and mild patchy opacity basilar right lower lobe. Streaky parenchymal density middle lobe  CT abd  findings --Bilateral small effusions    Treatment: IV zosyn  to present  , 1x IV vanco ,SLT consult ,Labs carefully monitored  Thank you  [email or web address]  Options provided:  -- Gram negative pneumonia  -- Gram positive pneumonia  -- Bacterial pneumonia  -- Aspiration pneumonia  -- Hypostatic pneumonia  -- Other - I will add my own diagnosis  -- Disagree - Not applicable / Not valid  -- Disagree - Clinically unable to determine / Unknown  -- Refer to Clinical Documentation Reviewer    PROVIDER RESPONSE TEXT:    This patient has aspiration pneumonia. Query created by:  Jarad Key on 2022 1:26 PM      Electronically signed by:  Johny Barros MD 2022 12:21 PM

## 2022-02-01 NOTE — PROGRESS NOTES
.Bedside, Verbal and Written shift change report given to Magalys Arevalo (oncoming nurse) by Arina Rosenthal RN (offgoing nurse). Report included the following information SBAR, Kardex and MAR.

## 2022-02-01 NOTE — PROGRESS NOTES
CM notified discharge held until tomorrow as Mg lab will not be back in time for review. CM notified patient may discharge back to the [facility] today pending approval from facility. Needs Rapid covid and approval from facility. Transition of care to SNF: [facility]     Communication to Patient/Family:  Met with patient and family, and they are agreeable to the transition plan. The Plan for Transition of Care is related to the following treatment goals: The Patient and/or patient representative  was provided with a choice of provider and agrees   with the discharge plan. Yes [] No []    Freedom of choice list was provided with basic dialogue that supports the patient's individualized plan of care/goals and shares the quality data associated with the providers. Yes [] No []    SNF/Rehab Transition:  Patient has been accepted to [address] [place] [address],[address]. and meets criteria for admission. Patient will transported by [facility] and expected to leave at     . Communication to SNF/Rehab:  Bedside RN  has been notified to update the transition plan to the facility and call report [phone]    Discharge information has been updated on the AVS and sent via [email or web address] and/or CC link. Discharge instructions to be fax'd to facility at [phone]     Please include all hard scripts for controlled substances, med rec and dc summary, and AVS in packet. Please medicate for pain prior to dc if possible and needed to help offset delay when patient first arrives to facility. Reviewed and confirmed with facility, [facility] of , can manage the patient care needs for the following:     Hammad Crews with (X) only those applicable:  Medication:  []Medications are available at the facility  []IV Antibiotics    []Controlled Substance  hard copies available sent.   []Weekly Labs    Equipment:  []CPAP/BiPAP  []Wound Vacuum  []Rossi or Urinary Device  []PICC/Central Line  []Nebulizer  []Ventilator    Treatment:  []Isolation (for MRSA, VRE, etc.)  []Surgical Drain Management  []Tracheostomy Care  []Dressing Changes  []Dialysis with transportation  []PEG Care  []Oxygen  []Daily Weights for Heart Failure    Dietary:  []Any diet limitations  []Tube Feedings   []Total Parenteral Management (TPN)    Financial Resources:  []Medicaid Application Completed    []UAI Completed and copy given to pt/family. []A screening has previously been completed. []Level II Completed    [] Private pay individual who will not become financially eligible for Medicaid within 6 months from admission to a [address] [place] [address] facility. [] Individual refused to have screening conducted. []Medicaid Application Completed    []The screening denied because it was determined individual did not need/did not qualify for nursing facility level of care. [] Out of state residents seeking direct admission to a [address] facility. [] Individuals who are inpatients of an out of state hospital, or in state or out of state veterans/ hospital and seek direct admission to a [address] facility  [] Individuals who are pateints or residents of a state owned/operated facility that is licensed by [employer] of [employer]) and seek direct admission to [address] [place] [address]  [] A screening not required for enrollment in [address] [address] [address] services as set out in [facility] 30-  [] [facility]) staff shall perform screenings of the [facility] clients. Advanced Care Plan:  []Surrogate Decision Maker of Care  []POA  []Communicated Code Status and copy sent.     Other:

## 2022-02-01 NOTE — PROGRESS NOTES
Critical lab results received for potassium and magnesium. Reported to on call and new orders given.

## 2022-02-01 NOTE — ROUTINE PROCESS
Bedside and Verbal shift change report given to Edis Kendrick RN (oncoming nurse) by Radha Garcia RN (offgoing nurse). Report included the following information SBAR and Kardex.

## 2022-02-02 VITALS
DIASTOLIC BLOOD PRESSURE: 68 MMHG | HEIGHT: 60 IN | HEART RATE: 89 BPM | TEMPERATURE: 98.1 F | OXYGEN SATURATION: 99 % | SYSTOLIC BLOOD PRESSURE: 124 MMHG | WEIGHT: 117.2 LBS | RESPIRATION RATE: 18 BRPM | BODY MASS INDEX: 23.01 KG/M2

## 2022-02-02 LAB
ALBUMIN SERPL-MCNC: 3.4 G/DL (ref 3.4–5)
ALBUMIN/GLOB SERPL: 1.4 {RATIO} (ref 0.8–1.7)
ALP SERPL-CCNC: 52 U/L (ref 45–117)
ALT SERPL-CCNC: 28 U/L (ref 13–56)
ANION GAP SERPL CALC-SCNC: 11 MMOL/L (ref 3–18)
AST SERPL-CCNC: 23 U/L (ref 10–38)
BILIRUB SERPL-MCNC: 1.2 MG/DL (ref 0.2–1)
BUN SERPL-MCNC: 7 MG/DL (ref 7–18)
BUN/CREAT SERPL: 18 (ref 12–20)
CALCIUM SERPL-MCNC: 8 MG/DL (ref 8.5–10.1)
CHLORIDE SERPL-SCNC: 109 MMOL/L (ref 100–111)
CO2 SERPL-SCNC: 21 MMOL/L (ref 21–32)
CREAT SERPL-MCNC: 0.38 MG/DL (ref 0.6–1.3)
ERYTHROCYTE [DISTWIDTH] IN BLOOD BY AUTOMATED COUNT: 25.5 % (ref 11.6–14.5)
GLOBULIN SER CALC-MCNC: 2.4 G/DL (ref 2–4)
GLUCOSE SERPL-MCNC: 71 MG/DL (ref 74–99)
HCT VFR BLD AUTO: 25.3 % (ref 35–45)
HGB BLD-MCNC: 8.5 G/DL (ref 12–16)
MAGNESIUM SERPL-MCNC: 2.4 MG/DL (ref 1.6–2.6)
MAGNESIUM SERPL-MCNC: 2.6 MG/DL (ref 1.6–2.6)
MAGNESIUM SERPL-MCNC: 3.3 MG/DL (ref 1.6–2.6)
MCH RBC QN AUTO: 26.2 PG (ref 24–34)
MCHC RBC AUTO-ENTMCNC: 33.6 G/DL (ref 31–37)
MCV RBC AUTO: 77.8 FL (ref 78–100)
NRBC # BLD: 0 K/UL (ref 0–0.01)
NRBC BLD-RTO: 0 PER 100 WBC
PLATELET # BLD AUTO: 193 K/UL (ref 135–420)
PMV BLD AUTO: 11.3 FL (ref 9.2–11.8)
POTASSIUM SERPL-SCNC: 3.1 MMOL/L (ref 3.5–5.5)
PROT SERPL-MCNC: 5.8 G/DL (ref 6.4–8.2)
RBC # BLD AUTO: 3.25 M/UL (ref 4.2–5.3)
SODIUM SERPL-SCNC: 141 MMOL/L (ref 136–145)
WBC # BLD AUTO: 6.6 K/UL (ref 4.6–13.2)

## 2022-02-02 PROCEDURE — 80053 COMPREHEN METABOLIC PANEL: CPT

## 2022-02-02 PROCEDURE — 74011250637 HC RX REV CODE- 250/637: Performed by: HOSPITALIST

## 2022-02-02 PROCEDURE — 74011000250 HC RX REV CODE- 250: Performed by: FAMILY MEDICINE

## 2022-02-02 PROCEDURE — 74011250636 HC RX REV CODE- 250/636: Performed by: FAMILY MEDICINE

## 2022-02-02 PROCEDURE — 83735 ASSAY OF MAGNESIUM: CPT

## 2022-02-02 PROCEDURE — 36415 COLL VENOUS BLD VENIPUNCTURE: CPT

## 2022-02-02 PROCEDURE — 74011000258 HC RX REV CODE- 258: Performed by: EMERGENCY MEDICINE

## 2022-02-02 PROCEDURE — 92526 ORAL FUNCTION THERAPY: CPT

## 2022-02-02 PROCEDURE — 85027 COMPLETE CBC AUTOMATED: CPT

## 2022-02-02 PROCEDURE — 74011250636 HC RX REV CODE- 250/636: Performed by: EMERGENCY MEDICINE

## 2022-02-02 PROCEDURE — 74011250636 HC RX REV CODE- 250/636: Performed by: HOSPITALIST

## 2022-02-02 RX ORDER — LEVOFLOXACIN 500 MG/1
500 TABLET, FILM COATED ORAL DAILY
Qty: 3 TABLET | Refills: 0 | Status: SHIPPED
Start: 2022-02-02 | End: 2022-02-05

## 2022-02-02 RX ADMIN — ENOXAPARIN SODIUM 40 MG: 100 INJECTION SUBCUTANEOUS at 09:52

## 2022-02-02 RX ADMIN — FAMOTIDINE 20 MG: 10 INJECTION, SOLUTION INTRAVENOUS at 09:52

## 2022-02-02 RX ADMIN — Medication 500 MG: at 09:53

## 2022-02-02 RX ADMIN — PIPERACILLIN AND TAZOBACTAM 4.5 G: 4; .5 INJECTION, POWDER, LYOPHILIZED, FOR SOLUTION INTRAVENOUS; PARENTERAL at 11:48

## 2022-02-02 RX ADMIN — PIPERACILLIN AND TAZOBACTAM 4.5 G: 4; .5 INJECTION, POWDER, LYOPHILIZED, FOR SOLUTION INTRAVENOUS; PARENTERAL at 05:58

## 2022-02-02 NOTE — ROUTINE PROCESS
TRANSFER - OUT REPORT:    Verbal report given to [id number] Alexandra RN(name) on Ambar Alba  being transferred to [facility] (unit) for routine progression of care       Report consisted of patients Situation, Background, Assessment and   Recommendations(SBAR). Information from the following report(s) SBAR and Kardex was reviewed with the receiving nurse. Lines:       Opportunity for questions and clarification was provided.       Patient transported with:   Medical Transport

## 2022-02-02 NOTE — PROGRESS NOTES
Bedside and Verbal shift change report given to Tashia Ruiz RN (oncoming nurse) by Angélica Matthews RN (offgoing nurse). Report included the following information SBAR, Kardex, MAR, Recent Results and Cardiac Rhythm .

## 2022-02-02 NOTE — PROGRESS NOTES
Patient will:  1. Tolerate PO trials with 0 s/s overt distress in 4/5 trials. 2. Utilize compensatory swallow strategies/maneuvers (decrease bite/sip, size/rate, alt. liq/sol) with min cues in 4/5 trials. 3. Perform oral-motor/laryngeal exercises to increase oropharyngeal swallow function with min cues. Rec:     Puree diet with thin liquids  Pt may require assistance with meal set up  Aspiration precautions  HOB >45 during po intake, remain >30 for 30-45 minutes after po   Small bites/sips; alternate liquid/solid with slow feeding rate   Oral care TID  Meds crushed in puree    [id number] [facility] TREATMENT    Patient: Pa Olivarez (21 y.o. female)  Date: 2/2/2022  Diagnosis: Sepsis ([address] [A41.9] Sepsis ([address]       Precautions: Fall,Skin, Aspiration  PLOF: History of CVA, PNA    ASSESSMENT:  Pt seen this morning by ST with meal tray. Pt refused all foods on tray via spoon from ST by shaking head and closing mouth. Pt asked to name preferred food items but was unable/unwilling to verbalize. Pt offered thin liquids via straw. Pt consumed ~ 1 oz juice and ~ 1 oz milk with no overt s/sx of aspiration. Pt then indicated she was done. Pt refused oral care on this day. Continue to educate Pt on importance of oral care to reduce aspiration risk. Continue current diet and POC. Pt may benefit from consult with dietician at next level of care to discuss food preferences. Progression toward goals:  []         Improving appropriately and progressing toward goals  [x]         Improving slowly and progressing toward goals  []         Not making progress toward goals and plan of care will be adjusted     PLAN:  Recommendations and Planned Interventions:  See above  Patient continues to benefit from skilled intervention to address the above impairments. Continue treatment per established plan of care.   Discharge Recommendations:  Skilled Nursing Facility     SUBJECTIVE:   Patient stated it's okay. OBJECTIVE:   Cognitive and Communication Status:  Neurologic State: Drowsy,Lethargic  Orientation Level: Oriented to person  Cognition: Decreased attention/concentration,Decreased command following  Perception: Verbal,Visual  Perseveration: No perseveration noted  Safety/Judgement: Decreased insight into deficits  Dysphagia Treatment:  Oral Assessment:  Oral Assessment  Labial: No impairment  Dentition: Limited  Oral Hygiene:  (attempted oral care but patient refused)  Lingual: Decreased rate,Decreased strength  Velum: Unable to visualize  Mandible: No impairment  Gag Reflex:  (did not assess)  P.O. Trials:   Patient Position:  (upright in bed)   Vocal quality prior to P.O.: Fatigue,Low volume   Consistency Presented: Mechanical soft,Pudding,Thin liquid   How Presented: SLP-fed/presented,Straw       Bolus Acceptance: Impaired (patient refused everything except thin liquid)   Laryngeal Elevation: Functional   Aspiration Signs/Symptoms: None   Pharyngeal Phase Characteristics: Poor endurance,Other (comment) (pt refused all consistencies except thin)   Effective Modifications: Straw   Cues for Modifications:  Moderate   Comments:  (Patient refused PO of puree; oral care)     Oral Phase Severity: Mild-moderate   Pharyngeal Phase Severity: suspect mild-moderate    PAIN:  Pain level pre-treatment: 0/10   Pain level post-treatment: 0/10     After treatment:   []              Patient left in no apparent distress sitting up in chair  [x]              Patient left in no apparent distress in bed  []              Call bell left within reach  [x]              Nursing notified  []              Family present  []              Caregiver present  []              Bed alarm activated      COMMUNICATION/EDUCATION:   [x] Aspiration precautions; swallow safety; compensatory techniques  []        Patient unable to participate in education; education ongoing with staff  []  Posted safety precautions in patient's room.  [] Oral-motor/laryngeal strengthening exercises      David Nolan, DIOGO  Time Calculation: 15 mins

## 2022-02-02 NOTE — DISCHARGE SUMMARY
Discharge Summary    Patient: Morris Frazier MRN: [id number]  CSN: [id number]    YOB: 1935  Age: 80 y.o. Sex: female    DOA: 1/26/2022 LOS:  LOS: 7 days   Discharge Date:      Primary Care Provider:  Barbara Soriano MD    Admission Diagnoses: Sepsis [facility]) [A41.9]    Discharge Diagnoses:    Hospital Problems  Date Reviewed: 1/27/2022          Codes Class Noted POA    Hypomagnesemia ICD-10-CM: E83.42  ICD-9-CM: 275.2  2/1/2022 Unknown        Anemia ICD-10-CM: D64.9  ICD-9-CM: 285.9  1/30/2022 Unknown        Memory loss or impairment ICD-10-CM: R41.3  ICD-9-CM: 780.93  1/27/2022 Yes        History of stroke ICD-10-CM: Z86.73  ICD-9-CM: V12.54  1/27/2022 Yes        Hypokalemia ICD-10-CM: E87.6  ICD-9-CM: 276.8  1/27/2022 Unknown        Hypoalbuminemia ICD-10-CM: E88.09  ICD-9-CM: 273.8  1/27/2022 Unknown        * (Principal) Sepsis ([facility] ICD-10-CM: A41.9  ICD-9-CM: 038.9, 995.91  1/26/2022 Yes        UTI (urinary tract infection) ICD-10-CM: N39.0  ICD-9-CM: 599.0  1/26/2022 Yes        Multifocal pneumonia ICD-10-CM: J18.9  ICD-9-CM: 443  1/26/2022 Yes        COPD (chronic obstructive pulmonary disease) ([facility] ICD-10-CM: J44.9  ICD-9-CM: 830  1/26/2022 Yes        HTN (hypertension) ICD-10-CM: I10  ICD-9-CM: 401.9  1/26/2022 Yes        Acute hypoxemic respiratory failure ([facility] ICD-10-CM: J96.01  ICD-9-CM: 518.81  1/26/2022 Yes        Choledocholithiasis ICD-10-CM: K80.50  ICD-9-CM: 574.50  1/26/2022 Yes        Difficulty swallowing ICD-10-CM: R13.10  ICD-9-CM: 787.20  1/26/2022 Yes        Constipation ICD-10-CM: K59.00  ICD-9-CM: 564.00  1/26/2022 Yes              Discharge Condition: stable     Discharge Medications:     Current Discharge Medication List      START taking these medications    Details   levoFLOXacin (Levaquin) 500 mg tablet Take 1 Tablet by mouth daily for 3 days.   Qty: 3 Tablet, Refills: 0  Start date: 2/2/2022, End date: 2/5/2022         CONTINUE these medications which have NOT CHANGED    Details   magnesium oxide (MAG-OX) 400 mg tablet Take 400 mg by mouth daily. pantoprazole (PROTONIX) 20 mg tablet Take 20 mg by mouth daily. atorvastatin (LIPITOR) 40 mg tablet Take  by mouth daily. escitalopram oxalate (Lexapro) 10 mg tablet Take 10 mg by mouth daily. acetaminophen (TYLENOL) 325 mg tablet Take  by mouth every four (4) hours as needed for Pain. food supplemt, lactose-reduced (Boost High Protein) 0.06 gram- 1 kcal/mL liqd Take  by mouth.      multivitamin (ONE A DAY) tablet Take 1 Tablet by mouth daily. clopidogreL (PLAVIX) 75 mg tab Take  by mouth.      potassium chloride SA (MICRO-K) 10 mEq capsule Take 10 mEq by mouth two (2) times a day. senna (Senna) 8.6 mg tablet Take 1 Tablet by mouth daily. polyethylene glycol (MIRALAX) 17 gram packet Take 17 g by mouth daily. Procedures : none     Consults: None      PHYSICAL EXAM   Visit Vitals  /75   Pulse 98   Temp 98.3 °F (36.8 °C)   Resp 18   Ht 5' (1.524 m)   Wt 53.2 kg (117 lb 3.2 oz)   SpO2 97%   BMI 22.89 kg/m²     General: Awake, cooperative, no acute distress    HEENT: NC, Atraumatic. PERRLA, EOMI. Anicteric sclerae. Lungs:  CTA Bilaterally. No Wheezing/Rhonchi/Rales. Heart:  Regular  rhythm,  No murmur, No Rubs, No Gallops  Abdomen: Soft, Non distended, Non tender. +Bowel sounds, colectomy bag noted   Extremities: No c/c/e  Psych:   Not anxious or agitated. Neurologic:  No acute neurological deficits. Admission HPI :   Jose Adan is a 80 y.o. female with prior stroke, hypertension, memory impairment, COPD, and choledocholithiasis with gallbladder fistula who presents via EMS from the [facility] at [facility] for hypotension and shortness of breath. History is unobtainable from the patient due to memory impairment. She walks with a walker.  The patient is unable to distinguish the difference between being at the [facility] vs. the hospital.    Hospital Course :   59-year-old female with prior stroke, hypertension, memory impairment, COPD, and choledocholithiasis with chronic gallbladder fistula is admitted for acute hypoxemic respiratory failure with sepsis due to multifocal pneumonia and UTI.     Since she was admitted to the hospital, IV antibiotics has been started for broad coverage for  hospital-acquired pneumonia/aspiration pneumonia. With the treatment,  she is able to be off oxygen,her acute respiratory failure resolved. Recommend to  continue aspiration precaution, covid 19 has been negative, she received 7 days iv abx for her pna, due to high risk aspiration, will continue another 3 days po abx after discharge. Her hemoglobin has been monitored closely , a little bit drop from baseline but has been stable. H/h drop is most likely due to dilution. CT abdomen no hematoma. No bleeding reported. She has hx  of Choledocholithiasis with gallbladder fistula-she was deemed not a surgical candidate and she previously pulled out a cholecystostomy tube-now bag placed before admission-confirmed with son. She received placement of her potassium and magnesium. She also received received albumin administration for her hypoalbuminemia. She remained hemodynamic stable during her stay.   She also received speech evaluation and recommended pureed.         Activity: Activity as tolerated    Diet: Dysphagia diet-pureed    Follow-up: PCP    Disposition: [facility]     Minutes spent on discharge: 45 min       Labs: Results:       Chemistry Recent Labs     02/02/22  0225 02/01/22  1110 02/01/22  0200 01/31/22  0455 01/31/22  0455   GLU 71* 71* 76   < > 73*    141 143   < > 144   K 3.1* 4.1 2.9*   < > 3.8    108 110   < > 112*   CO2 21 26 26   < > 27   BUN 7 5* 5*   < > 5*   CREA 0.38* 0.32* 0.34*   < > 0.41*   CA 8.0* 8.0* 8.0*   < > 8.2*   AGAP 11 7 7   < > 5   BUCR 18 16 15   < > 12   AP 52  --  45  --  45   TP 5.8*  --  5.4*  --  5.5*   ALB 3.4  --  3.4  --  3.6   GLOB 2.4  --  2.0  --  1.9*   AGRAT 1.4  --  1.7  --  1.9*    < > = values in this interval not displayed. CBC w/Diff Recent Labs     02/02/22  0225 02/01/22  0200 01/31/22  0940 01/31/22  0455 01/31/22  0455   WBC 6.6 5.5  --   --  5.1   RBC 3.25* 3.09*  --   --  3.01*   HGB 8.5* 8.1* 7.9*   < > 7.6*   HCT 25.3* 24.6* 24.3*   < > 24.1*    218  --   --  272    < > = values in this interval not displayed. Cardiac Enzymes No results for input(s): CPK, CKND1, OVIDIO in the last 72 hours. No lab exists for component: CKRMB, TROIP   Coagulation No results for input(s): PTP, INR, APTT, INREXT in the last 72 hours. Lipid Panel No results found for: CHOL, CHOLPOCT, CHOLX, CHLST, CHOLV, 723135, HDL, HDLP, LDL, LDLC, DLDLP, 317449, VLDLC, VLDL, TGLX, TRIGL, TRIGP, TGLPOCT, CHHD, CHHDX   BNP No results for input(s): BNPP in the last 72 hours. Liver Enzymes Recent Labs     02/02/22 0225   TP 5.8*   ALB 3.4   AP 52      Thyroid Studies No results found for: T4, T3U, TSH, TSHEXT         Significant Diagnostic Studies: CT CHEST ABD PELV W CONT    Result Date: 1/26/2022  EXAM:CT chest abdomen and pelvis with contrast CLINICAL INDICATION/HISTORY: Sepsis, hypotension COMPARISON: None TECHNIQUE: Multislice helical CT was performed through the chest, abdomen and pelvis with IV contrast. Coronal and sagittal reformations were generated. One or more dose reduction techniques were used on this CT: automated exposure control, adjustment of the mAs and/or kVp according to patient's size, and iterative reconstruction techniques. The specific techniques utilized on this CT exam have been documented in the patient's electronic medical record.   Digital imaging and communications and medicine (DICOM) format image data are available to nonaffiliated external healthcare facilities or entities on a secure, media free, reciprocally searchable basis with patient authorization for at least a 12 month period after this study. ========== FINDINGS: ------------------ CHEST: LUNGS: There is streaky and patchy opacities throughout the left lower lobe most pronounced basilar segments and mild patchy opacity basilar right lower lobe. Streaky parenchymal density middle lobe and lingula and mild additional peripheral chronic fibrotic changes with mild biapical scarring. No abnormal mass. PLEURA: Minimal left pleural effusion. AIRWAY: Mild bilateral lower lobe bronchiectasis. MEDIASTINUM: Normal heart and great vessels. No adenopathy. OTHER: Multiple chronic appearing left-sided rib fractures. No acute or aggressive osseous finding. ------------------ ABDOMEN/PELVIS: LIVER/BILIARY: No suspicious liver lesion. No biliary dilation. Gallbladder unremarkable. SPLEEN: Normal. PANCREAS: Normal. ADRENALS: Normal. KIDNEYS: Small nonobstructing bilateral renal calculi. No abnormal mass or obstruction. Benign-appearing left lower pole renal cyst. Ureters and bladder unremarkable. LYMPH NODES: No technically enlarged nodes. GI TRACT: Mildly distended stool-filled rectum with nonspecific air-fluid levels within colon without significant dilatation or wall thickening. No abnormal small bowel dilatation or wall thickening. No ascites or free air. VASCULATURE: Moderate aortoiliac atherosclerotic calcification. PELVIC ORGANS: Previous hysterectomy. OTHER: No aggressive appearing osseous lesion. Multilevel lower lumbar degenerative spondylosis. ==========     1. Bilateral lower lobe pneumonia left greater than right with minimal left pleural effusion with bilateral lower lobe bronchiectasis. Additional mild areas of atelectasis or scarring bilaterally. 2. Mildly distended stool-filled rectum likely related to constipation with no evidence of obstruction. 3. No other acute or significant abdominal or pelvic CT finding. CT ABD PELV WO CONT    Result Date: 1/31/2022  EXAM: CT of the abdomen and pelvis INDICATION: Pain.  COMPARISON: 1/26/2022 TECHNIQUE: Axial CT imaging of the abdomen and pelvis was performed without intravenous contrast. Multiplanar reformats were generated. One or more dose reduction techniques were used on this CT: automated exposure control, adjustment of the mAs and/or kVp according to patient size, and iterative reconstruction techniques. The specific techniques used on this CT exam have been documented in the patient's electronic medical record. Digital Imaging and Communications in Medicine (DICOM) format image data are available to nonaffiliated external healthcare facilities or entities on a secure, media free, reciprocally searchable basis with patient authorization for at least a 12-month period after this study. _______________ FINDINGS: LOWER CHEST: Emphysema. Small bilateral effusions/atelectasis. LIVER, BILIARY: Left hepatic lobe focal fatty infiltration. . No biliary dilation. Nondilated gallbladder with soft tissue tract extending to the right lateral abdominal skin. PANCREAS: Normal. SPLEEN: Normal. ADRENALS: Normal. KIDNEYS/URETERS/BLADDER: Left midpole nausea and calculus. No hydronephrosis. PELVIC ORGANS: Hysterectomy. VASCULATURE: Scattered vascular calcifications. LYMPH NODES: No enlarged lymph nodes. GASTROINTESTINAL TRACT: Moderate rectal fecal impaction. Diverticulosis. No bowel dilation or wall thickening. BONES: No acute or aggressive osseous abnormalities identified. OTHER: No retroperitoneal hematoma. _______________     No acute intra-abdominal abnormality. Emphysema. Bilateral small effusions/atelectasis. Focal fatty infiltration. Diverticulosis. Moderate rectal fecal impaction. Nonobstructing left nephrolithiasis. Nondilated gallbladder with soft tissue tract extending to the right lateral abdominal skin. May reflect fistulous communication. Correlate clinically and consider nuclear hepatobiliary scan.     XR CHEST PORT    Result Date: 1/26/2022  EXAM: Portable frontal view of the chest. CLINICAL INDICATION/HISTORY: Hypotension COMPARISON: None. _______________ FINDINGS: Normal heart size with no mediastinal mass. Cardiac device overlying left side heart. There is some hazy abnormal parenchymal density left lung base with slight blunting left lateral costophrenic angle. No other lung consolidation or mass. Multiple chronic appearing left-sided rib fractures. _______________     1. FINDINGS suggestive of pneumonia and/or atelectasis left lower lung, possible small left pleural effusion.             [facility]     CC: Jose Luis Espinoza MD

## 2022-02-02 NOTE — PROGRESS NOTES
Bedside and Verbal shift change report given to Izzy Moreno RN (oncoming nurse) by JEWLE Bell RN (offgoing nurse). Report included the following information SBAR, Kardex, Intake/Output, MAR and Recent Results.

## 2022-02-02 NOTE — PROGRESS NOTES
D/C PLan: Discharge back to the [facility] today at 240 Ava spoke with family member who stated that unless he speaks with someone at the [facility] concerning the plan of care he will not allow patient to discharge back to the [facility] today. CM called  at the [facility] wo is relaying message to nursing admin to call the son. 1525-Followed up with son, no call yet. CM followed up with intake coodinator, facility is aware and plans to call the son, coordinator aware of 1600 transport. 1539-CM received a call and the facility has spoken with son and he approves patient going back today. CM spoke with son and he approved her going back today. JAZZ reviewed. D/C Plan: Discharge back to the [facility] today, transport at 1430, pushed back to 1600    Transition of care to SNF: [facility]      Communication to Patient/Family:  Met with patient and family, and they are agreeable to the transition plan. The Plan for Transition of Care is related to the following treatment goals:      The Patient and/or patient representative  was provided with a choice of provider and agrees   with the discharge plan.  Yes [x]? No [x]?     Freedom of choice list was provided with basic dialogue that supports the patient's individualized plan of care/goals and shares the quality data associated with the providers.     Yes [x]? No [x]?     SNF/Rehab Transition:  Patient has been accepted to [facility] and meets criteria for admission. Patient will [facility] by [facility] and expected to leave at     .     Communication to SNF/Rehab:  Bedside RN  has been notified to update the transition plan to the facility and call report [phone]     Discharge information has been updated on the AVS and sent via [facility] and/or CC link.     Discharge instructions to be fax'd to facility at [phone]     Please include all hard scripts for controlled substances, med rec and dc summary, and AVS in packet. Please medicate for pain prior to dc if possible and needed to help offset delay when patient first arrives to facility.     Reviewed and confirmed with facility, [facility], can manage the patient care needs for the following:      Ben with (X) only those applicable:  Medication:  [x]? Medications are available at the facility  []? IV Antibiotics    []? Controlled Substance  hard copies available sent.  []? Weekly Labs     Equipment:  []?CPAP/BiPAP  []? Wound Vacuum  []? Rossi or Urinary Device  []? PICC/Central Line  []? Nebulizer  []? Ventilator     Treatment:  []? Isolation (for MRSA, VRE, etc.)  []? Surgical Drain Management  []? Tracheostomy Care  []? Dressing Changes  []? Dialysis with transportation  []? PEG Care  []? Oxygen  []? Daily Weights for Heart Failure     Dietary:  []? Any diet limitations  []? Tube Feedings   []? Total Parenteral Management (TPN)     Financial Resources:  []? Medicaid Application Completed     []? [employer] Completed and copy given to pt/family.     []? A screening has previously been completed.     []? Level II Completed     []? Private pay individual who will not become financially eligible for Medicaid within 6 months from admission to a [address] [place] [address].      []? Individual refused to have screening conducted. []? Medicaid Application Completed     []? The screening denied because it was determined individual did not need/did not qualify for nursing facility level of care.     []? Out of state residents seeking direct admission to a [address] facility.  []? Individuals who are inpatients of an out of state hospital, or in state or out of state veterans/ hospital and seek direct admission to a [address] facility  []? Individuals who are pateints or residents of a state owned/operated facility that is licensed by Department of Behavioral Services (DBHDS) and seek direct admission to [address] facility  []?  A screening not required for enrollment in [facility] services as set out in [facility]-  []? [address] [facility]) staff shall perform screenings of the [facility] clients.     Advanced Care Plan:  []? Surrogate Decision Maker of Care  []? POA  []? Communicated Code Status and copy sent.     Other:                            Revision History

## 2022-02-02 NOTE — PROGRESS NOTES
2/2/2022  PT treatment not completed due to:  [] Off Unit for testing/procedure  [] Pain  [] Eating  [] Patient too lethargic  [] Nausea/vomiting  [] Dialysis treatment in progress   [x] Other: pt sleeping soundly and did not arouse. Will f/u at later time as pt schedule allows. Thank you.    Ivy Hernadez, PT

## 2022-02-02 NOTE — PROGRESS NOTES
Problem: Pressure Injury - Risk of  Goal: *Prevention of pressure injury  Description: Document Joey Scale and appropriate interventions in the flowsheet. Outcome: Progressing Towards Goal  Note: Pressure Injury Interventions:  Sensory Interventions: Assess changes in LOC    Moisture Interventions: Absorbent underpads    Activity Interventions: Pressure redistribution bed/mattress(bed type),PT/OT evaluation    Mobility Interventions: HOB 30 degrees or less    Nutrition Interventions: Document food/fluid/supplement intake    Friction and Shear Interventions: HOB 30 degrees or less                Problem: Falls - Risk of  Goal: *Absence of Falls  Description: Document Joseph Fall Risk and appropriate interventions in the flowsheet.   Outcome: Progressing Towards Goal  Note: Fall Risk Interventions:  Mobility Interventions: Patient to call before getting OOB    Mentation Interventions: Door open when patient unattended    Medication Interventions: Teach patient to arise slowly    Elimination Interventions: Call light in reach              Problem: Patient Education: Go to Patient Education Activity  Goal: Patient/Family Education  Outcome: Progressing Towards Goal